# Patient Record
Sex: FEMALE | Race: WHITE | NOT HISPANIC OR LATINO | Employment: OTHER | URBAN - NONMETROPOLITAN AREA
[De-identification: names, ages, dates, MRNs, and addresses within clinical notes are randomized per-mention and may not be internally consistent; named-entity substitution may affect disease eponyms.]

---

## 2017-04-12 DIAGNOSIS — G89.29 OTHER CHRONIC PAIN: ICD-10-CM

## 2017-04-12 RX ORDER — GABAPENTIN 300 MG/1
300 CAPSULE ORAL 3 TIMES DAILY
Qty: 90 CAP | Refills: 3 | Status: CANCELLED | OUTPATIENT
Start: 2017-04-12

## 2017-04-12 RX ORDER — NAPROXEN 500 MG/1
500 TABLET ORAL 2 TIMES DAILY WITH MEALS
Qty: 60 TAB | Refills: 3 | Status: CANCELLED | OUTPATIENT
Start: 2017-04-12

## 2017-05-17 DIAGNOSIS — G89.29 OTHER CHRONIC PAIN: ICD-10-CM

## 2017-05-18 RX ORDER — GABAPENTIN 300 MG/1
300 CAPSULE ORAL 3 TIMES DAILY
Qty: 90 CAP | Refills: 0 | Status: SHIPPED | OUTPATIENT
Start: 2017-05-18 | End: 2017-05-30 | Stop reason: SDUPTHER

## 2017-05-18 RX ORDER — NAPROXEN 500 MG/1
500 TABLET ORAL 2 TIMES DAILY WITH MEALS
Qty: 60 TAB | Refills: 0 | Status: SHIPPED | OUTPATIENT
Start: 2017-05-18 | End: 2017-05-30

## 2017-05-30 ENCOUNTER — OFFICE VISIT (OUTPATIENT)
Dept: MEDICAL GROUP | Facility: CLINIC | Age: 42
End: 2017-05-30
Payer: COMMERCIAL

## 2017-05-30 VITALS
RESPIRATION RATE: 18 BRPM | WEIGHT: 285 LBS | BODY MASS INDEX: 42.21 KG/M2 | TEMPERATURE: 98.1 F | HEART RATE: 84 BPM | HEIGHT: 69 IN | SYSTOLIC BLOOD PRESSURE: 132 MMHG | DIASTOLIC BLOOD PRESSURE: 68 MMHG | OXYGEN SATURATION: 95 %

## 2017-05-30 DIAGNOSIS — Z23 NEED FOR VACCINATION: ICD-10-CM

## 2017-05-30 DIAGNOSIS — Z12.31 VISIT FOR SCREENING MAMMOGRAM: ICD-10-CM

## 2017-05-30 DIAGNOSIS — G89.29 OTHER CHRONIC PAIN: ICD-10-CM

## 2017-05-30 DIAGNOSIS — M54.9 UPPER BACK PAIN: ICD-10-CM

## 2017-05-30 DIAGNOSIS — E66.01 MORBID OBESITY WITH BMI OF 40.0-44.9, ADULT (HCC): ICD-10-CM

## 2017-05-30 PROCEDURE — 90471 IMMUNIZATION ADMIN: CPT | Performed by: PHYSICIAN ASSISTANT

## 2017-05-30 PROCEDURE — 99214 OFFICE O/P EST MOD 30 MIN: CPT | Mod: 25 | Performed by: PHYSICIAN ASSISTANT

## 2017-05-30 PROCEDURE — 90715 TDAP VACCINE 7 YRS/> IM: CPT | Performed by: PHYSICIAN ASSISTANT

## 2017-05-30 RX ORDER — MELOXICAM 7.5 MG/1
7.5 TABLET ORAL DAILY
Qty: 30 TAB | Refills: 2 | Status: SHIPPED | OUTPATIENT
Start: 2017-05-30 | End: 2021-04-01

## 2017-05-30 RX ORDER — GABAPENTIN 300 MG/1
300 CAPSULE ORAL 3 TIMES DAILY
Qty: 90 CAP | Refills: 2 | Status: SHIPPED | OUTPATIENT
Start: 2017-05-30 | End: 2021-04-01

## 2017-05-30 RX ORDER — CYCLOBENZAPRINE HCL 5 MG
5 TABLET ORAL
Qty: 7 TAB | Refills: 0 | Status: SHIPPED | OUTPATIENT
Start: 2017-05-30 | End: 2021-04-01

## 2017-05-30 ASSESSMENT — PAIN SCALES - GENERAL: PAINLEVEL: 7=MODERATE-SEVERE PAIN

## 2017-05-30 NOTE — PROGRESS NOTES
Chief Complaint   Patient presents with   • Establish Care   • Back Pain     lower x 6 years / upper x 2 years   • Orders Needed     mammo       HISTORY OF PRESENT ILLNESS: Patient is a 41 y.o. female established patient who presents today for evaluation and management of:    Upper back pain  Has been using a  that has helped. Has been to physical therapy in the past. She believes this made her pain worse. Her  occasionally massages her back which helps sometimes. She uses hot packs each evening which alleviate the pain enough so she can get to sleep. She notices that heavy lifting makes this pain worse. She has been through pain management before and does not want to restart opioids. This pain radiates down her left arm. Gabapentin alleviates some of her pain but she does not like the side effects.     Morbid obesity with BMI of 40.0-44.9, adult (Formerly [facility])  Patient states she is struggling with this due to use of gabapentin as it increases her appetite. She is attempting to reduce her calorie intake. She realizes that reducing her weight will alleviate some of her upper back pain.         Patient Active Problem List    Diagnosis Date Noted   • Morbid obesity with BMI of 40.0-44.9, adult (Formerly [facility]) 05/30/2017   • Pain of right eye 06/17/2016   • Wart 06/17/2016   • Hypothyroid 05/17/2016   • Family history of diabetes mellitus (DM) 03/31/2016   • Chronic low back pain 03/31/2016   • Upper back pain 03/31/2016   • Insomnia 03/31/2016       Allergies:Fentanyl and Tramadol    Current Outpatient Prescriptions   Medication Sig Dispense Refill   • meloxicam (MOBIC) 7.5 MG Tab Take 1 Tab by mouth every day. 30 Tab 2   • cyclobenzaprine (FLEXERIL) 5 MG tablet Take 1 Tab by mouth 1 time daily as needed for Moderate Pain. 7 Tab 0   • gabapentin (NEURONTIN) 300 MG Cap Take 1 Cap by mouth 3 times a day. 90 Cap 2   • amitriptyline (ELAVIL) 25 MG Tab Take 1 Tab by mouth at bedtime as needed. 30 Tab 5   • Omega-3 Fatty  "Acids (FISH OIL) 1000 MG Cap capsule Take 1,000 mg by mouth 1 time daily as needed.     • Methylsulfonylmethane (MSM PO) Take  by mouth.     • ascorbic acid (ASCORBIC ACID) 500 MG Tab Take 1,000 mg by mouth every day.     • POTASSIUM PO Take  by mouth.       No current facility-administered medications for this visit.       Social History   Substance Use Topics   • Smoking status: Former Smoker     Types: Cigarettes   • Smokeless tobacco: Former User     Quit date: 03/31/1996   • Alcohol Use: No       No family status information on file.   History reviewed. No pertinent family history.    Review of Systems:   Constitutional: Negative for fever, chills, weight loss and malaise/fatigue.   HENT: Negative for ear pain, nosebleeds, congestion, sore throat and neck pain.    Eyes: Negative for blurred vision.   Respiratory: Negative for cough, sputum production, shortness of breath and wheezing.    Cardiovascular: Negative for chest pain, palpitations, orthopnea and leg swelling.   Gastrointestinal: Negative for heartburn, nausea, vomiting and abdominal pain.   Genitourinary: Negative for dysuria, urgency and frequency.   Musculoskeletal: See history of present illness above. Negative for myalgias, joint pain.   Skin: Negative for rash and itching.   Neurological: Negative for dizziness, tingling, tremors, sensory change, focal weakness and headaches. Negative for recent headaches. She occasionally gets migraines that are well-controlled if she uses amitriptyline to go to sleep every night.   Endo/Heme/Allergies: Does not bruise/bleed easily.   Psychiatric/Behavioral: Negative for depression, suicidal ideas and memory loss.  The patient is not nervous/anxious and does not have insomnia.      Exam:  Blood pressure 132/68, pulse 84, temperature 36.7 °C (98.1 °F), resp. rate 18, height 1.753 m (5' 9.02\"), weight 129.275 kg (285 lb), SpO2 95 %.  Body mass index is 42.07 kg/(m^2).  General:  Obese female in NAD  Head: is " grossly normal.  Neck: Supple without masses. Thyroid is not visibly enlarged.  Pulmonary: Clear to ausculation. Normal effort. No rales, ronchi, or wheezing.  Cardiovascular: Regular rate and rhythm without murmur. Carotid and radial pulses are intact and equal bilaterally.  Extremities: no clubbing, cyanosis, or edema.  Musculoskeletal: Tenderness to palpation at upper thoracic left side of spinous processes. Left hand pain is stimulated to palpation of this region.    Medical decision-making and discussion:  1. Upper back pain  - REFERRAL TO PAIN CLINIC  - meloxicam (MOBIC) 7.5 MG Tab; Take 1 Tab by mouth every day.  Dispense: 30 Tab; Refill: 2  - cyclobenzaprine (FLEXERIL) 5 MG tablet; Take 1 Tab by mouth 1 time daily as needed for Moderate Pain.  Dispense: 7 Tab; Refill: 0  - gabapentin (NEURONTIN) 300 MG Cap; Take 1 Cap by mouth 3 times a day.  Dispense: 90 Cap; Refill: 2  - MR-CERVICAL SPINE-W/O; Future  - MR-THORACIC SPINE-W/O; Future    2. Visit for screening mammogram  - [id number]; Future    3. Need for vaccination  - Tdap =>8yo IM    5. Morbid obesity with BMI of 40.0-44.9, adult (CMS-[facility])  - Patient identified as having weight management issue.  Appropriate orders and counseling given.      Please note that this dictation was created using voice recognition software. I have made every reasonable attempt to correct obvious errors, but I expect that there are errors of grammar and possibly content that I did not discover before finalizing the note.      Return for Female annual.

## 2017-05-30 NOTE — MR AVS SNAPSHOT
"        Rina Roger   2017 10:40 AM   Office Visit   MRN: [id number]    [facility] Phone:  [phone]    Description:  Female : 1975   Provider:  Trinity Steven PA-C           Reason for Visit     Establish Care     Back Pain lower x 6 years / upper x 2 years    Orders Needed mammo      Allergies as of 2017     Allergen Noted Reactions    Fentanyl 2016       Tramadol 2016         You were diagnosed with     Upper back pain   [2570448]       Visit for screening mammogram   [[id number]]       Need for vaccination   [[id number]]       Other chronic pain   [338.29.ICD-9-CM]       Morbid obesity with BMI of 40.0-44.9, adult (CMS-[facility])   [216686]         Vital Signs     Blood Pressure Pulse Temperature Respirations Height Weight    132/68 mmHg 84 36.7 °C (98.1 °F) 18 1.753 m (5' 9.02\") 129.275 kg (285 lb)    Body Mass Index Oxygen Saturation Smoking Status             42.07 kg/m2 95% Former Smoker         Basic Information     Date Of Birth Sex Race Ethnicity Preferred Language    1975 Female White Unknown English      Problem List              ICD-10-CM Priority Class Noted - Resolved    Family history of diabetes mellitus (DM) Z83.3   3/31/2016 - Present    Chronic low back pain M54.5, G89.29   3/31/2016 - Present    Upper back pain M54.9   3/31/2016 - Present    Insomnia G47.00   3/31/2016 - Present    Hypothyroid E03.9   2016 - Present    Pain of right eye H57.11   2016 - Present    Wart B07.9   2016 - Present    Morbid obesity with BMI of 40.0-44.9, adult ([facility]) E66.01, Z68.41   2017 - Present      Health Maintenance        Date Due Completion Dates    IMM DTaP/Tdap/Td Vaccine (1 - Tdap) 10/1/1994 ---    PAP SMEAR 10/1/1996 ---    MAMMOGRAM 10/1/2015 ---            Current Immunizations     Tdap Vaccine 2017      Below and/or attached are the medications your provider expects you to take. Review all of your home medications and newly " ordered medications with your provider and/or pharmacist. Follow medication instructions as directed by your provider and/or pharmacist. Please keep your medication list with you and share with your provider. Update the information when medications are discontinued, doses are changed, or new medications (including over-the-counter products) are added; and carry medication information at all times in the event of emergency situations     Allergies:  FENTANYL - (reactions not documented)     TRAMADOL - (reactions not documented)               Medications  Valid as of: May 30, 2017 -  4:21 PM    Generic Name Brand Name Tablet Size Instructions for use    Amitriptyline HCl (Tab) ELAVIL 25 MG Take 1 Tab by mouth at bedtime as needed.        Ascorbic Acid (Tab) ascorbic acid 500 MG Take 1,000 mg by mouth every day.        Cyclobenzaprine HCl (Tab) FLEXERIL 5 MG Take 1 Tab by mouth 1 time daily as needed for Moderate Pain.        Gabapentin (Cap) NEURONTIN 300 MG Take 1 Cap by mouth 3 times a day.        Meloxicam (Tab) MOBIC 7.5 MG Take 1 Tab by mouth every day.        Methylsulfonylmethane   Take  by mouth.        Omega-3 Fatty Acids (Cap) fish oil 1000 MG Take 1,000 mg by mouth 1 time daily as needed.        Potassium   Take  by mouth.        .                 Medicines prescribed today were sent to:     [facility] - [address] [facility] [address]    Phone: [phone] Fax: [phone]    Open 24 Hours?: No      Medication refill instructions:       If your prescription bottle indicates you have medication refills left, it is not necessary to call your provider’s office. Please contact your pharmacy and they will refill your medication.    If your prescription bottle indicates you do not have any refills left, you may request refills at any time through one of the following ways: The online EasyLink system (except Urgent Care), by calling your  provider’s office, or by asking your pharmacy to contact your provider’s office with a refill request. Medication refills are processed only during regular business hours and may not be available until the next business day. Your provider may request additional information or to have a follow-up visit with you prior to refilling your medication.   *Please Note: Medication refills are assigned a new Rx number when refilled electronically. Your pharmacy may indicate that no refills were authorized even though a new prescription for the same medication is available at the pharmacy. Please request the medicine by name with the pharmacy before contacting your provider for a refill.        Your To Do List     Future Labs/Procedures Complete By Expires    [id number]  As directed 5/30/2018    MR-CERVICAL SPINE-W/O  As directed 5/30/2018    MR-THORACIC SPINE-W/O  As directed 5/30/2018      Referral     A referral request has been sent to our patient care coordination department. Please allow 3-5 business days for us to process this request and contact you either by phone or mail. If you do not hear from us by the 5th business day, please call us at [phone].           [employer] Access Code: Activation code not generated  Current [employer] Status: Active

## 2020-04-13 ENCOUNTER — TELEMEDICINE (OUTPATIENT)
Dept: CARDIOLOGY | Facility: MEDICAL CENTER | Age: 45
End: 2020-04-13
Payer: MEDICAID

## 2020-04-13 VITALS — BODY MASS INDEX: 39.99 KG/M2 | HEIGHT: 69 IN | WEIGHT: 270 LBS | HEART RATE: 94 BPM

## 2020-04-13 DIAGNOSIS — R06.83 SNORING: ICD-10-CM

## 2020-04-13 DIAGNOSIS — R00.2 PALPITATIONS: ICD-10-CM

## 2020-04-13 DIAGNOSIS — E66.01 MORBID OBESITY WITH BMI OF 40.0-44.9, ADULT (HCC): ICD-10-CM

## 2020-04-13 PROCEDURE — 99204 OFFICE O/P NEW MOD 45 MIN: CPT | Mod: 95,CR | Performed by: INTERNAL MEDICINE
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
## 2020-04-14 ENCOUNTER — TELEPHONE (OUTPATIENT)
Dept: CARDIOLOGY | Facility: MEDICAL CENTER | Age: 45
End: 2020-04-14

## 2020-04-14 NOTE — TELEPHONE ENCOUNTER
OPO per TW faxed to   [facility]  F: [phone]  T: [phone]    Pt. INS: Wes

## 2020-04-30 ENCOUNTER — HOSPITAL ENCOUNTER (OUTPATIENT)
Dept: CARDIOLOGY | Facility: MEDICAL CENTER | Age: 45
End: 2020-04-30
Attending: INTERNAL MEDICINE
Payer: MEDICAID

## 2020-04-30 DIAGNOSIS — R00.2 PALPITATIONS: ICD-10-CM

## 2020-04-30 PROCEDURE — 93306 TTE W/DOPPLER COMPLETE: CPT

## 2020-05-01 LAB
LV EJECT FRACT  99904: 70
LV EJECT FRACT MOD 2C 99903: 67.84
LV EJECT FRACT MOD 4C 99902: 71
LV EJECT FRACT MOD BP 99901: 69.78

## 2020-05-01 PROCEDURE — 93306 TTE W/DOPPLER COMPLETE: CPT | Mod: 26 | Performed by: INTERNAL MEDICINE

## 2021-03-29 ENCOUNTER — NURSE TRIAGE (OUTPATIENT)
Dept: HEALTH INFORMATION MANAGEMENT | Facility: OTHER | Age: 46
End: 2021-03-29

## 2021-03-29 SDOH — ECONOMIC STABILITY: HOUSING INSECURITY: IN THE LAST 12 MONTHS, HOW MANY PLACES HAVE YOU LIVED?: 1

## 2021-03-29 SDOH — ECONOMIC STABILITY: HOUSING INSECURITY
IN THE LAST 12 MONTHS, WAS THERE A TIME WHEN YOU DID NOT HAVE A STEADY PLACE TO SLEEP OR SLEPT IN A SHELTER (INCLUDING NOW)?: NO

## 2021-03-29 SDOH — HEALTH STABILITY: PHYSICAL HEALTH: ON AVERAGE, HOW MANY MINUTES DO YOU ENGAGE IN EXERCISE AT THIS LEVEL?: 50 MINUTES

## 2021-03-29 SDOH — HEALTH STABILITY: MENTAL HEALTH
STRESS IS WHEN SOMEONE FEELS TENSE, NERVOUS, ANXIOUS, OR CAN'T SLEEP AT NIGHT BECAUSE THEIR MIND IS TROUBLED. HOW STRESSED ARE YOU?: RATHER MUCH

## 2021-03-29 SDOH — ECONOMIC STABILITY: INCOME INSECURITY: IN THE LAST 12 MONTHS, WAS THERE A TIME WHEN YOU WERE NOT ABLE TO PAY THE MORTGAGE OR RENT ON TIME?: YES

## 2021-03-29 SDOH — ECONOMIC STABILITY: TRANSPORTATION INSECURITY
IN THE PAST 12 MONTHS, HAS LACK OF RELIABLE TRANSPORTATION KEPT YOU FROM MEDICAL APPOINTMENTS, MEETINGS, WORK OR FROM GETTING THINGS NEEDED FOR DAILY LIVING?: NO

## 2021-03-29 SDOH — ECONOMIC STABILITY: HOUSING INSECURITY
IN THE LAST 12 MONTHS, WAS THERE A TIME WHEN YOU DID NOT HAVE A STEADY PLACE TO SLEEP OR SLEPT IN A SHELTER (INCLUDING NOW)?: YES

## 2021-03-29 SDOH — HEALTH STABILITY: PHYSICAL HEALTH: ON AVERAGE, HOW MANY DAYS PER WEEK DO YOU ENGAGE IN MODERATE TO STRENUOUS EXERCISE (LIKE A BRISK WALK)?: 3 DAYS

## 2021-03-29 SDOH — ECONOMIC STABILITY: TRANSPORTATION INSECURITY
IN THE PAST 12 MONTHS, HAS THE LACK OF TRANSPORTATION KEPT YOU FROM MEDICAL APPOINTMENTS OR FROM GETTING MEDICATIONS?: NO

## 2021-03-29 ASSESSMENT — SOCIAL DETERMINANTS OF HEALTH (SDOH)
HOW OFTEN DO YOU GET TOGETHER WITH FRIENDS OR RELATIVES?: NEVER
HOW OFTEN DO YOU ATTEND CHURCH OR RELIGIOUS SERVICES?: NEVER
HOW OFTEN DO YOU HAVE A DRINK CONTAINING ALCOHOL: NEVER
WITHIN THE PAST 12 MONTHS, THE FOOD YOU BOUGHT JUST DIDN'T LAST AND YOU DIDN'T HAVE MONEY TO GET MORE: NEVER TRUE
HOW HARD IS IT FOR YOU TO PAY FOR THE VERY BASICS LIKE FOOD, HOUSING, MEDICAL CARE, AND HEATING?: SOMEWHAT HARD
HOW OFTEN DO YOU HAVE SIX OR MORE DRINKS ON ONE OCCASION: NEVER
HOW OFTEN DO YOU ATTENT MEETINGS OF THE CLUB OR ORGANIZATION YOU BELONG TO?: NEVER
IN A TYPICAL WEEK, HOW MANY TIMES DO YOU TALK ON THE PHONE WITH FAMILY, FRIENDS, OR NEIGHBORS?: MORE THAN THREE TIMES A WEEK
DO YOU BELONG TO ANY CLUBS OR ORGANIZATIONS SUCH AS CHURCH GROUPS UNIONS, FRATERNAL OR ATHLETIC GROUPS, OR SCHOOL GROUPS?: NO
HOW MANY DRINKS CONTAINING ALCOHOL DO YOU HAVE ON A TYPICAL DAY WHEN YOU ARE DRINKING: PATIENT DECLINED
WITHIN THE PAST 12 MONTHS, YOU WORRIED THAT YOUR FOOD WOULD RUN OUT BEFORE YOU GOT THE MONEY TO BUY MORE: SOMETIMES TRUE

## 2021-03-29 NOTE — TELEPHONE ENCOUNTER
"PT WANTING TO EST CARE. HAS NOT SEEN PCP IN 6 YEARS. WANTS A NEW PROVIDER. EXP PALPITATIONS, GYNECOLOGICAL ISSUES. REVIEWED CURRENT SX. DISCUSSED HOME CARE, WHEN TO SEEK EMERGENCY CARE AND APPT MADE.     Reason for Disposition  • Palpitations are a chronic symptom (recurrent or ongoing AND present > 4 weeks)    Additional Information  • Negative: Passed out (i.e., fainted, collapsed and was not responding)  • Negative: Shock suspected (e.g., cold/pale/clammy skin, too weak to stand, low BP, rapid pulse)  • Negative: Difficult to awaken or acting confused (e.g., disoriented, slurred speech)  • Negative: Visible sweat on face or sweat dripping down face  • Negative: Unable to walk, or can only walk with assistance (e.g., requires support)  • Negative: Received SHOCK from implantable cardiac defibrillator and has persisting symptoms (i.e., palpitations, lightheadedness)  • Negative: Sounds like a life-threatening emergency to the triager  • Negative: Chest pain  • Negative: Difficulty breathing  • Negative: Dizziness, lightheadedness, or weakness  • Negative: Heart beating very rapidly (e.g., > 140 / minute) and present now (EXCEPTION: during exercise)  • Negative: Heart beating very slowly (e.g., < 50 / minute) (EXCEPTION: athlete)  • Negative: Wearing a \"holter monitor\" or \"cardiac event monitor\"  • Negative: Received SHOCK from implantable cardiac defibrillator (and now feels well)  • Negative: Heart beating very rapidly (e.g., > 140 / minute) and not present now (EXCEPTION: during exercise)  • Negative: Skipped or extra beat(s) and increases with exercise or exertion  • Negative: Skipped or extra beat(s) and occurs 4 or more times per minute  • Negative: History of heart disease (i.e., heart attack, bypass surgery, angina, angioplasty)  • Negative: Age > 60 years  • Negative: Taking water pill (i.e., diuretic) or heart medication (e.g., digoxin)  • Negative: Patient wants to be seen  • Negative: History of " "hyperthyroidism or taking thyroid medication  • Negative: Known or suspected substance abuse (e.g., cocaine, alcohol abuse)  • Negative: Palpitations and no improvement after following Care Advice  • Negative: Problems with anxiety or stress    Answer Assessment - Initial Assessment Questions  1. DESCRIPTION: \"Please describe your heart rate or heart beat that you are having\" (e.g., fast/slow, regular/irregular, skipped or extra beats, \"palpitations\")      palpitations  2. ONSET: \"When did it start?\" (Minutes, hours or days)       years  3. DURATION: \"How long does it last\" (e.g., seconds, minutes, hours)      Seconds, twice a month  4. PATTERN \"Does it come and go, or has it been constant since it started?\"  \"Does it get worse with exertion?\"   \"Are you feeling it now?\"      Comes and goes  5. TAP: \"Using your hand, can you tap out what you are feeling on a chair or table in front of you, so that I can hear?\" (Note: not all patients can do this)        n/a  6. HEART RATE: \"Can you tell me your heart rate?\" \"How many beats in 15 seconds?\"  (Note: not all patients can do this)        n/a  7. RECURRENT SYMPTOM: \"Have you ever had this before?\" If so, ask: \"When was the last time?\" and \"What happened that time?\"       yes  8. CAUSE: \"What do you think is causing the palpitations?\"      unsure  9. CARDIAC HISTORY: \"Do you have any history of heart disease?\" (e.g., heart attack, angina, bypass surgery, angioplasty, arrhythmia)       no  10. OTHER SYMPTOMS: \"Do you have any other symptoms?\" (e.g., dizziness, chest pain, sweating, difficulty breathing)        no  11. PREGNANCY: \"Is there any chance you are pregnant?\" \"When was your last menstrual period?\"        no    Protocols used: HEART RATE AND HEARTBEAT [id number]      "

## 2021-04-01 ENCOUNTER — OFFICE VISIT (OUTPATIENT)
Dept: MEDICAL GROUP | Facility: CLINIC | Age: 46
End: 2021-04-01
Payer: MEDICAID

## 2021-04-01 VITALS
RESPIRATION RATE: 16 BRPM | HEIGHT: 69 IN | BODY MASS INDEX: 41.32 KG/M2 | HEART RATE: 82 BPM | TEMPERATURE: 97.7 F | DIASTOLIC BLOOD PRESSURE: 72 MMHG | WEIGHT: 279 LBS | SYSTOLIC BLOOD PRESSURE: 124 MMHG | OXYGEN SATURATION: 94 %

## 2021-04-01 DIAGNOSIS — R00.2 PALPITATIONS: ICD-10-CM

## 2021-04-01 DIAGNOSIS — E66.01 MORBID OBESITY WITH BMI OF 40.0-44.9, ADULT (HCC): ICD-10-CM

## 2021-04-01 DIAGNOSIS — F33.1 MODERATE EPISODE OF RECURRENT MAJOR DEPRESSIVE DISORDER (HCC): ICD-10-CM

## 2021-04-01 DIAGNOSIS — Z00.00 BLOOD TESTS FOR ROUTINE GENERAL PHYSICAL EXAMINATION: ICD-10-CM

## 2021-04-01 DIAGNOSIS — E03.9 ACQUIRED HYPOTHYROIDISM: ICD-10-CM

## 2021-04-01 DIAGNOSIS — E55.9 VITAMIN D DEFICIENCY: ICD-10-CM

## 2021-04-01 DIAGNOSIS — R73.03 PREDIABETES: ICD-10-CM

## 2021-04-01 DIAGNOSIS — H57.11 PAIN OF RIGHT EYE: ICD-10-CM

## 2021-04-01 DIAGNOSIS — Z12.31 ENCOUNTER FOR SCREENING MAMMOGRAM FOR BREAST CANCER: ICD-10-CM

## 2021-04-01 DIAGNOSIS — N95.1 PERIMENOPAUSAL SYMPTOMS: ICD-10-CM

## 2021-04-01 DIAGNOSIS — Z86.59 HISTORY OF POSTTRAUMATIC STRESS DISORDER (PTSD): ICD-10-CM

## 2021-04-01 PROBLEM — E66.9 OBESITY (BMI 30-39.9): Status: ACTIVE | Noted: 2021-04-01

## 2021-04-01 PROBLEM — F32.9 MAJOR DEPRESSIVE DISORDER: Status: ACTIVE | Noted: 2021-04-01

## 2021-04-01 PROCEDURE — 99204 OFFICE O/P NEW MOD 45 MIN: CPT | Performed by: PHYSICIAN ASSISTANT

## 2021-04-01 RX ORDER — SERTRALINE HYDROCHLORIDE 25 MG/1
25 TABLET, FILM COATED ORAL DAILY
Qty: 30 TABLET | Refills: 0 | Status: SHIPPED
Start: 2021-04-01 | End: 2021-05-26

## 2021-04-01 ASSESSMENT — PATIENT HEALTH QUESTIONNAIRE - PHQ9
5. POOR APPETITE OR OVEREATING: 2 - MORE THAN HALF THE DAYS
CLINICAL INTERPRETATION OF PHQ2 SCORE: 3
SUM OF ALL RESPONSES TO PHQ QUESTIONS 1-9: 11

## 2021-04-01 NOTE — PROGRESS NOTES
Chief Complaint   Patient presents with   • Establish Care     pt states she has a list of things to go over.         HPI:  Rina is a 45 y.o. female new patient presenting with the following:    Hypothyroid  This is been an intermittent problem for this patient in the past.  She has been on thyroid replacement but is not currently taking any.  Patient has not had labs done in a few years.  We will reorder labs to check levels and follow-up in 2 weeks.  We will determine if thyroid replacement is needed at that time.    Palpitations  This is a chronic condition for this patient.  Patient states that when she gets palpitations that they frightened her and make her panic. Patient had an echocardiogram done 05/20 that was normal.  There has been no additional work-up done for the palpitations.  We will order a 48-hour Holter monitor to see if we can capture these palpitations and assess their nature.  We will follow-up with her in 2 weeks to review labs and Holter report if available.  Depending on what the Holter monitor shows we may send her back for cardiology reevaluation.    Major depressive disorder  This is a chronic condition for this patient.  She has been on medication in the distant past but cannot remember which medication she was on. Her PHQ-9 in the office today is 11 with thoughts of being better off dead.  Patient states that she is not suicidal just sometimes wishes she would not wake up in the morning due to the amount of stress and depression.  Patient has requested a referral to behavioral health for evaluation and counseling.  We will start her on sertraline 25 mg daily as she tends to be very sensitive to medications.  We will follow-up with her in 2 weeks and make adjustments to her medication if necessary.    Perimenopausal symptoms  Patient states that she is having hot flashes, night sweats, severe mood swings and heavier than normal periods.  She states that she is overly emotional and will  cry for no reason.  This is very distressing for this patient.  We will start her on sertraline 25 mg daily to try and help with the mood swings.  Should she continue to have very heavy menstrual cycles we will make a referral to gynecology for possible IUD implantation to control her bleeding but she is not interested in doing this at this time.  We will follow-up with her in 2 weeks to evaluate the effectiveness of this medication.      Patient Active Problem List    Diagnosis Date Noted   • Prediabetes 04/01/2021   • History of posttraumatic stress disorder (PTSD) 04/01/2021   • Major depressive disorder 04/01/2021   • Vitamin D deficiency 04/01/2021   • Perimenopausal symptoms 04/01/2021   • Palpitations 04/13/2020   • Morbid obesity with BMI of 40.0-44.9, adult (HCC) 05/30/2017   • Pain of right eye 06/17/2016   • Wart 06/17/2016   • Hypothyroid 05/17/2016   • Family history of diabetes mellitus (DM) 03/31/2016   • Chronic low back pain 03/31/2016   • Upper back pain 03/31/2016   • Insomnia 03/31/2016       Current Outpatient Medications   Medication Sig Dispense Refill   • sertraline (ZOLOFT) 25 MG tablet Take 1 tablet by mouth every day. 30 tablet 0   • Omega-3 Fatty Acids (FISH OIL) 1000 MG Cap capsule Take 1,000 mg by mouth 1 time daily as needed.     • Methylsulfonylmethane (MSM PO) Take  by mouth.     • POTASSIUM PO Take  by mouth.       No current facility-administered medications for this visit.         Allergies as of 04/01/2021 - Reviewed 04/01/2021   Allergen Reaction Noted   • Fentanyl Shortness of Breath 03/31/2016   • Tramadol Vomiting and Nausea 03/31/2016        Social History     Socioeconomic History   • Marital status:      Spouse name: eva Roger   • Number of children: 1   • Years of education: Not on file   • Highest education level: 11th grade   Occupational History   • Occupation: dispatcher     Employer: OTHER     Comment: self employed   Tobacco Use   • Smoking status:  Former Smoker     Packs/day: 1.00     Years: 35.00     Pack years: 35.00     Types: Cigarettes     Start date: 1991   • Smokeless tobacco: Never Used   Substance and Sexual Activity   • Alcohol use: No     Alcohol/week: 0.0 oz   • Drug use: Yes     Frequency: 3.0 times per week     Types: Marijuana, Oral     Comment: tincture   • Sexual activity: Yes     Partners: Male     Birth control/protection: None   Other Topics Concern   • Not on file   Social History Narrative   • Not on file     Social Determinants of Health     Financial Resource Strain: Medium Risk   • Difficulty of Paying Living Expenses: Somewhat hard   Food Insecurity: Food Insecurity Present   • Worried About Running Out of Food in the Last Year: Sometimes true   • Ran Out of Food in the Last Year: Never true   Transportation Needs: No Transportation Needs   • Lack of Transportation (Medical): No   • Lack of Transportation (Non-Medical): No   Physical Activity: Sufficiently Active   • Days of Exercise per Week: 3 days   • Minutes of Exercise per Session: 50 min   Stress: Stress Concern Present   • Feeling of Stress : Rather much   Social Connections: Somewhat Isolated   • Frequency of Communication with Friends and Family: More than three times a week   • Frequency of Social Gatherings with Friends and Family: Never   • Attends Denominational Services: Never   • Active Member of Clubs or Organizations: No   • Attends Club or Organization Meetings: Never   • Marital Status:    Intimate Partner Violence:    • Fear of Current or Ex-Partner:    • Emotionally Abused:    • Physically Abused:    • Sexually Abused:        Family History   Problem Relation Age of Onset   • Lung Disease Mother         COPD   • Hypertension Mother         Pulmonary hypertension   • Arthritis Mother    • Cancer Mother         melanoma (skin)   • Lupus Mother    • Mult Sclerosis Father    • Hypertension Brother    • Alcohol abuse Brother    • Cancer Maternal Grandmother          "Uterine   • Diabetes Maternal Grandfather    • Heart Disease Maternal Grandfather    • Hypertension Maternal Grandfather    • Hyperlipidemia Maternal Grandfather    • Heart Attack Maternal Grandfather    • No Known Problems Paternal Grandfather        Past Surgical History:   Procedure Laterality Date   • CARPAL TUNNEL RELEASE Right 2012   • CHOLECYSTECTOMY  2011       Review of Systems:   Constitutional: Negative for fever, chills, weight change, fatigue, loss of appetite.  HNT: Negative for nosebleeds, congestion, odynophagia, sore throat or changes in taste.    Eyes: Negative for vision changes.   Ears: Negative for recent hearing changes, pain or discharge.  Neck: Negative for pain, swelling, lumps or goiter.  Respiratory: Negative for cough, sputum production, shortness of breath and wheezing.    Cardiovascular: Negative for chest pain, palpitations, orthopnea and leg swelling.   Gastrointestinal: Negative for constipation, diarrhea, heartburn, dysphagia, nausea, vomiting or abdominal pain.   Genitourinary: Negative for dysuria, urgency and frequency.   Musculoskeletal: Negative for myalgias, joint pain, and back pain.  Skin: Negative for skin, hair or nail changes, rash, itching.   Neurological: Negative for dizziness, tingling, tremors, sensory change, gait/coordination changes, focal weakness and headaches.   Endo/Heme/Allergies: Does not bruise/bleed easily.   Psychiatric/Behavioral: Positive for anxiety, depression.  Negative for suicidal ideas and memory loss.  The patient does not have insomnia.      No LMP recorded.     Physical Exam:  /72   Pulse 82   Temp 36.5 °C (97.7 °F) (Temporal)   Resp 16   Ht 1.753 m (5' 9\")   Wt (!) 127 kg (279 lb)   SpO2 94%   BMI 41.20 kg/m²    General:  Well nourished, well developed female. With a Body mass index is 41.2 kg/m².  Tearful and anxious in the clinic today.  Eyes: EOM intact, PERRL, conjunctiva non-injected, sclera non-icteric.  Ears: Lizbeth pinnae, " external auditory canals, TM pearly gray with normal light reflex bilaterally.  Neck: Neck supple with no cervical lymphadenopathy, JVD, palpable thyroid nodules or carotid bruits.  Pulmonary: Clear to ausculation bilaterally. Normal effort. No rales, ronchi, or wheezing.  Cardiovascular: Regular rate and rhythm without murmur, rub or gallop.   Extremities: Full range of motion. Warm and well perfused with no edema.  Skin: Intact with no obvious rashes or lesions.  Neuro: Cranial nerves I-XII grossly intact.  Psych: Alert and oriented x 3.  Appropriately dressed. Mood and affect appropriate.    Assessment/Plan:    1. Prediabetes  Comp Metabolic Panel    HEMOGLOBIN A1C   2. History of posttraumatic stress disorder (PTSD)  Patient has been identified as having a positive depression screening. Appropriate orders and counseling have been given.    REFERRAL TO BEHAVIORAL HEALTH   3. Moderate episode of recurrent major depressive disorder (HCC)  Patient has been identified as having a positive depression screening. Appropriate orders and counseling have been given.    REFERRAL TO BEHAVIORAL HEALTH    sertraline (ZOLOFT) 25 MG tablet   4. Encounter for screening mammogram for breast cancer  MA-SCREENING MAMMO BILAT W/TOMOSYNTHESIS W/CAD   5. Acquired hypothyroidism     6. Pain of right eye  TSH    TRIIDOTHYRONINE    FREE THYROXINE   7. Morbid obesity with BMI of 40.0-44.9, adult (HCC)  Patient identified as having weight management issue.  Appropriate orders and counseling given.   8. Vitamin D deficiency  VITAMIN D,25 HYDROXY   9. Blood tests for routine general physical examination  CBC WITH DIFFERENTIAL    Comp Metabolic Panel    ESTIMATED GFR    Lipid Profile   10. Palpitations  HOLTER - Cardiology Performed (48HR)    CANCELED: HOLTER - Cardiology Performed (48HR)   11. Perimenopausal symptoms         Reviewed risks and benefits of treatment plan. Patient verbally agrees to plan of care.     Return in about 2 weeks  (around 4/15/2021) for f/u labs.      Please note that this dictation was created using voice recognition software. I have made every reasonable attempt to correct obvious errors, but I expect that there are errors of grammar and possibly content that I did not discover before finalizing the note.

## 2021-04-02 NOTE — ASSESSMENT & PLAN NOTE
This is a chronic condition for this patient.  Patient states that when she gets palpitations that they frightened her and make her panic. Patient had an echocardiogram done 05/20 that was normal.  There has been no additional work-up done for the palpitations.  We will order a 48-hour Holter monitor to see if we can capture these palpitations and assess their nature.  We will follow-up with her in 2 weeks to review labs and Holter report if available.  Depending on what the Holter monitor shows we may send her back for cardiology reevaluation.

## 2021-04-02 NOTE — ASSESSMENT & PLAN NOTE
Patient states that she is having hot flashes, night sweats, severe mood swings and heavier than normal periods.  She states that she is overly emotional and will cry for no reason.  This is very distressing for this patient.  We will start her on sertraline 25 mg daily to try and help with the mood swings.  Should she continue to have very heavy menstrual cycles we will make a referral to gynecology for possible IUD implantation to control her bleeding but she is not interested in doing this at this time.  We will follow-up with her in 2 weeks to evaluate the effectiveness of this medication.

## 2021-04-02 NOTE — ASSESSMENT & PLAN NOTE
This is a chronic condition for this patient.  She has been on medication in the distant past but cannot remember which medication she was on. Her PHQ-9 in the office today is 11 with thoughts of being better off dead.  Patient states that she is not suicidal just sometimes wishes she would not wake up in the morning due to the amount of stress and depression.  Patient has requested a referral to behavioral health for evaluation and counseling.  We will start her on sertraline 25 mg daily as she tends to be very sensitive to medications.  We will follow-up with her in 2 weeks and make adjustments to her medication if necessary.

## 2021-04-02 NOTE — ASSESSMENT & PLAN NOTE
This is been an intermittent problem for this patient in the past.  She has been on thyroid replacement but is not currently taking any.  Patient has not had labs done in a few years.  We will reorder labs to check levels and follow-up in 2 weeks.  We will determine if thyroid replacement is needed at that time.

## 2021-04-05 ENCOUNTER — APPOINTMENT (OUTPATIENT)
Dept: RADIOLOGY | Facility: IMAGING CENTER | Age: 46
End: 2021-04-05
Attending: FAMILY MEDICINE
Payer: MEDICAID

## 2021-04-05 ENCOUNTER — NON-PROVIDER VISIT (OUTPATIENT)
Dept: MEDICAL GROUP | Facility: CLINIC | Age: 46
End: 2021-04-05
Payer: MEDICAID

## 2021-04-05 ENCOUNTER — OFFICE VISIT (OUTPATIENT)
Dept: URGENT CARE | Facility: PHYSICIAN GROUP | Age: 46
End: 2021-04-05
Payer: MEDICAID

## 2021-04-05 ENCOUNTER — HOSPITAL ENCOUNTER (OUTPATIENT)
Facility: MEDICAL CENTER | Age: 46
End: 2021-04-05
Attending: PHYSICIAN ASSISTANT
Payer: MEDICAID

## 2021-04-05 VITALS
OXYGEN SATURATION: 96 % | RESPIRATION RATE: 16 BRPM | BODY MASS INDEX: 41.2 KG/M2 | HEART RATE: 71 BPM | HEIGHT: 69 IN | WEIGHT: 278.2 LBS | SYSTOLIC BLOOD PRESSURE: 130 MMHG | TEMPERATURE: 97.6 F | DIASTOLIC BLOOD PRESSURE: 82 MMHG

## 2021-04-05 DIAGNOSIS — R93.89 ABNORMAL CHEST X-RAY: ICD-10-CM

## 2021-04-05 DIAGNOSIS — R06.02 SOB (SHORTNESS OF BREATH): ICD-10-CM

## 2021-04-05 DIAGNOSIS — L30.9 PERIORBITAL DERMATITIS: ICD-10-CM

## 2021-04-05 PROCEDURE — 84480 ASSAY TRIIODOTHYRONINE (T3): CPT

## 2021-04-05 PROCEDURE — 99214 OFFICE O/P EST MOD 30 MIN: CPT | Performed by: FAMILY MEDICINE

## 2021-04-05 PROCEDURE — 36415 COLL VENOUS BLD VENIPUNCTURE: CPT | Performed by: PHYSICIAN ASSISTANT

## 2021-04-05 PROCEDURE — 82306 VITAMIN D 25 HYDROXY: CPT

## 2021-04-05 PROCEDURE — 80053 COMPREHEN METABOLIC PANEL: CPT

## 2021-04-05 PROCEDURE — 80061 LIPID PANEL: CPT

## 2021-04-05 PROCEDURE — 84443 ASSAY THYROID STIM HORMONE: CPT

## 2021-04-05 PROCEDURE — 84439 ASSAY OF FREE THYROXINE: CPT

## 2021-04-05 PROCEDURE — 85025 COMPLETE CBC W/AUTO DIFF WBC: CPT

## 2021-04-05 PROCEDURE — 83036 HEMOGLOBIN GLYCOSYLATED A1C: CPT

## 2021-04-05 RX ORDER — DEXAMETHASONE 6 MG/1
6 TABLET ORAL DAILY
Qty: 3 TABLET | Refills: 0 | Status: SHIPPED | OUTPATIENT
Start: 2021-04-05 | End: 2021-04-08

## 2021-04-05 RX ORDER — ALBUTEROL SULFATE 90 UG/1
2 AEROSOL, METERED RESPIRATORY (INHALATION) EVERY 6 HOURS PRN
Qty: 8.5 G | Refills: 3 | Status: ON HOLD
Start: 2021-04-05 | End: 2022-01-21

## 2021-04-05 RX ORDER — DESONIDE 0.5 MG/G
CREAM TOPICAL
Qty: 30 G | Refills: 0 | Status: SHIPPED | OUTPATIENT
Start: 2021-04-05 | End: 2021-04-07

## 2021-04-05 RX ORDER — MONTELUKAST SODIUM 10 MG/1
10 TABLET ORAL EVERY EVENING
Qty: 30 TABLET | Refills: 1 | Status: SHIPPED
Start: 2021-04-05 | End: 2021-05-26

## 2021-04-05 ASSESSMENT — ENCOUNTER SYMPTOMS: SHORTNESS OF BREATH: 1

## 2021-04-05 NOTE — PROGRESS NOTES
Subjective:      Rina Roger is a 45 y.o. female who presents with Shortness of Breath (thinks it might be allergies), Eye Swelling, and Itchy Eye      - This is a pleasant and nontoxic appearing 45 y.o. female with c/o for around 2-3 weeks has had SOB, described as feeling to have to take a deeper breath then usually occasionally. No cough or NVFC/CP. Has Hx of asthma and feels may be related to asthma/allergies as has had this feeling before in spring time.    - itch rash around both eyes for about 2 wks.  Nothing new that may have triggered this      ALLERGIES:  Fentanyl and Tramadol     PMH:  Past Medical History:   Diagnosis Date   • Allergy     seasonal, meds   • Anxiety    • Arrhythmia    • Asthma     as a child   • Depression    • Diabetes (HCC)     Prediabetes   • GERD (gastroesophageal reflux disease)    • Head ache     with her period   • Hypertension    • Migraine     stopped when she moved out of [place], possibly allergy related   • Thyroid disease         PSH:  Past Surgical History:   Procedure Laterality Date   • CARPAL TUNNEL RELEASE Right 2012   • CHOLECYSTECTOMY  2011       MEDS:    Current Outpatient Medications:   •  desonide (TRIDESILON) 0.05 % Cream, AAA BID x 1 wk, Disp: 30 g, Rfl: 0  •  montelukast (SINGULAIR) 10 MG Tab, Take 1 tablet by mouth every evening., Disp: 30 tablet, Rfl: 1  •  albuterol 108 (90 Base) MCG/ACT Aero Soln inhalation aerosol, Inhale 2 Puffs every 6 hours as needed for Shortness of Breath., Disp: 8.5 g, Rfl: 3  •  dexamethasone (DECADRON) 6 MG Tab, Take 1 tablet by mouth every day for 3 days., Disp: 3 tablet, Rfl: 0  •  sertraline (ZOLOFT) 25 MG tablet, Take 1 tablet by mouth every day., Disp: 30 tablet, Rfl: 0  •  Omega-3 Fatty Acids (FISH OIL) 1000 MG Cap capsule, Take 1,000 mg by mouth 1 time daily as needed., Disp: , Rfl:   •  Methylsulfonylmethane (MSM PO), Take  by mouth., Disp: , Rfl:   •  POTASSIUM PO, Take  by mouth., Disp: , Rfl:     ** I have  "documented what I find to be significant in regards to past medical, social, family and surgical history  in my HPI or under PMH/PSH/FH review section, otherwise it is noncontributory **       HPI    Review of Systems   Respiratory: Positive for shortness of breath.    Skin: Positive for itching.   All other systems reviewed and are negative.     Objective:     /82   Pulse 71   Temp 36.4 °C (97.6 °F) (Temporal)   Resp 16   Ht 1.753 m (5' 9\") Comment: w/shoes  Wt (!) 126 kg (278 lb 3.2 oz) Comment: w/shoes  SpO2 96%   BMI 41.08 kg/m²      Physical Exam  Vitals and nursing note reviewed.   Constitutional:       General: She is not in acute distress.     Appearance: Normal appearance. She is well-developed.   HENT:      Head: Normocephalic and atraumatic.   Eyes:      General: No scleral icterus.  Cardiovascular:      Heart sounds: Normal heart sounds. No murmur.   Pulmonary:      Effort: Pulmonary effort is normal. No respiratory distress.      Breath sounds: Normal breath sounds.   Skin:     Coloration: Skin is not jaundiced or pale.      Findings: Rash (mild periorbital atopic like rash, Rt>Lt) present.   Neurological:      Mental Status: She is alert.      Motor: No abnormal muscle tone.   Psychiatric:         Mood and Affect: Mood normal.         Behavior: Behavior normal.                 Assessment/Plan:            1. SOB (shortness of breath)  DX-CHEST-2 VIEWS    montelukast (SINGULAIR) 10 MG Tab    albuterol 108 (90 Base) MCG/ACT Aero Soln inhalation aerosol    dexamethasone (DECADRON) 6 MG Tab    REFERRAL TO PULMONARY AND SLEEP MEDICINE   2. Periorbital dermatitis  desonide (TRIDESILON) 0.05 % Cream    montelukast (SINGULAIR) 10 MG Tab    dexamethasone (DECADRON) 6 MG Tab   3. Abnormal chest x-ray  REFERRAL TO PULMONARY AND SLEEP MEDICINE     XRay: small nodule Rt mid lung field.     - Dx, plan & d/c instructions discussed w/ patient and/or family members  - Rest, stay hydrated.   - E.R. " precautions discussed       Follow up with their PCP in 2-3 days strongly advised to f/u on xray abnormality, ER if not improving or feeling/getting worse.    Any realistic side effects of medications that may have been given today (i.e. Rash, GI upset/constipation, sedation, elevation of BP or blood sugars) discussed.     Patient left in stable condition

## 2021-04-06 DIAGNOSIS — R73.03 PREDIABETES: ICD-10-CM

## 2021-04-06 DIAGNOSIS — H57.11 PAIN OF RIGHT EYE: ICD-10-CM

## 2021-04-06 DIAGNOSIS — E55.9 VITAMIN D DEFICIENCY: ICD-10-CM

## 2021-04-06 DIAGNOSIS — Z00.00 BLOOD TESTS FOR ROUTINE GENERAL PHYSICAL EXAMINATION: ICD-10-CM

## 2021-04-06 LAB
ALBUMIN SERPL BCP-MCNC: 4.4 G/DL (ref 3.2–4.9)
ALBUMIN/GLOB SERPL: 1.4 G/DL
ALP SERPL-CCNC: 79 U/L (ref 30–99)
ALT SERPL-CCNC: 19 U/L (ref 2–50)
ANION GAP SERPL CALC-SCNC: 12 MMOL/L (ref 7–16)
AST SERPL-CCNC: 20 U/L (ref 12–45)
BASOPHILS # BLD AUTO: 0.7 % (ref 0–1.8)
BASOPHILS # BLD: 0.08 K/UL (ref 0–0.12)
BILIRUB SERPL-MCNC: 0.3 MG/DL (ref 0.1–1.5)
BUN SERPL-MCNC: 19 MG/DL (ref 8–22)
CALCIUM SERPL-MCNC: 9.3 MG/DL (ref 8.5–10.5)
CHLORIDE SERPL-SCNC: 105 MMOL/L (ref 96–112)
CHOLEST SERPL-MCNC: 159 MG/DL (ref 100–199)
CO2 SERPL-SCNC: 22 MMOL/L (ref 20–33)
CREAT SERPL-MCNC: 0.65 MG/DL (ref 0.5–1.4)
EOSINOPHIL # BLD AUTO: 0.23 K/UL (ref 0–0.51)
EOSINOPHIL NFR BLD: 1.9 % (ref 0–6.9)
ERYTHROCYTE [DISTWIDTH] IN BLOOD BY AUTOMATED COUNT: 50.6 FL (ref 35.9–50)
EST. AVERAGE GLUCOSE BLD GHB EST-MCNC: 131 MG/DL
GLOBULIN SER CALC-MCNC: 3.1 G/DL (ref 1.9–3.5)
GLUCOSE SERPL-MCNC: 112 MG/DL (ref 65–99)
HBA1C MFR BLD: 6.2 % (ref 4–5.6)
HCT VFR BLD AUTO: 43.2 % (ref 37–47)
HDLC SERPL-MCNC: 29 MG/DL
HGB BLD-MCNC: 13.3 G/DL (ref 12–16)
IMM GRANULOCYTES # BLD AUTO: 0.06 K/UL (ref 0–0.11)
IMM GRANULOCYTES NFR BLD AUTO: 0.5 % (ref 0–0.9)
LDLC SERPL CALC-MCNC: 105 MG/DL
LYMPHOCYTES # BLD AUTO: 2.46 K/UL (ref 1–4.8)
LYMPHOCYTES NFR BLD: 20.3 % (ref 22–41)
MCH RBC QN AUTO: 31 PG (ref 27–33)
MCHC RBC AUTO-ENTMCNC: 30.8 G/DL (ref 33.6–35)
MCV RBC AUTO: 100.7 FL (ref 81.4–97.8)
MONOCYTES # BLD AUTO: 0.71 K/UL (ref 0–0.85)
MONOCYTES NFR BLD AUTO: 5.9 % (ref 0–13.4)
NEUTROPHILS # BLD AUTO: 8.55 K/UL (ref 2–7.15)
NEUTROPHILS NFR BLD: 70.7 % (ref 44–72)
NRBC # BLD AUTO: 0 K/UL
NRBC BLD-RTO: 0 /100 WBC
PLATELET # BLD AUTO: 215 K/UL (ref 164–446)
PMV BLD AUTO: 11.9 FL (ref 9–12.9)
POTASSIUM SERPL-SCNC: 4.6 MMOL/L (ref 3.6–5.5)
PROT SERPL-MCNC: 7.5 G/DL (ref 6–8.2)
RBC # BLD AUTO: 4.29 M/UL (ref 4.2–5.4)
SODIUM SERPL-SCNC: 139 MMOL/L (ref 135–145)
T3 SERPL-MCNC: 140 NG/DL (ref 60–181)
T4 FREE SERPL-MCNC: 0.93 NG/DL (ref 0.93–1.7)
TRIGL SERPL-MCNC: 127 MG/DL (ref 0–149)
TSH SERPL DL<=0.005 MIU/L-ACNC: 4.32 UIU/ML (ref 0.38–5.33)
WBC # BLD AUTO: 12.1 K/UL (ref 4.8–10.8)

## 2021-04-07 ENCOUNTER — OFFICE VISIT (OUTPATIENT)
Dept: MEDICAL GROUP | Facility: CLINIC | Age: 46
End: 2021-04-07
Payer: MEDICAID

## 2021-04-07 VITALS
TEMPERATURE: 97.4 F | HEART RATE: 89 BPM | SYSTOLIC BLOOD PRESSURE: 140 MMHG | DIASTOLIC BLOOD PRESSURE: 98 MMHG | WEIGHT: 275 LBS | RESPIRATION RATE: 20 BRPM | OXYGEN SATURATION: 97 % | BODY MASS INDEX: 41.68 KG/M2 | HEIGHT: 68 IN

## 2021-04-07 DIAGNOSIS — J45.909 ASTHMA DUE TO SEASONAL ALLERGIES: ICD-10-CM

## 2021-04-07 DIAGNOSIS — J22 LOWER RESPIRATORY TRACT INFECTION: ICD-10-CM

## 2021-04-07 LAB — 25(OH)D3 SERPL-MCNC: 20 NG/ML (ref 30–80)

## 2021-04-07 PROCEDURE — 99214 OFFICE O/P EST MOD 30 MIN: CPT | Performed by: PHYSICIAN ASSISTANT

## 2021-04-07 RX ORDER — FLUCONAZOLE 100 MG/1
100 TABLET ORAL DAILY
Qty: 1 TABLET | Refills: 0 | Status: SHIPPED
Start: 2021-04-07 | End: 2021-04-14

## 2021-04-07 RX ORDER — AMOXICILLIN AND CLAVULANATE POTASSIUM 875; 125 MG/1; MG/1
1 TABLET, FILM COATED ORAL 2 TIMES DAILY
Qty: 14 TABLET | Refills: 0 | Status: SHIPPED
Start: 2021-04-07 | End: 2021-04-14

## 2021-04-07 ASSESSMENT — FIBROSIS 4 INDEX: FIB4 SCORE: 0.96

## 2021-04-08 NOTE — ASSESSMENT & PLAN NOTE
Patient was seen in the urgent care on 4/5/2021 for difficulty breathing and chest discomfort.  Patient has a history of asthma that is frequently worsened during allergy season.  Patient was started on montelukast 10 mg daily and an albuterol inhaler.  Patient states that she has been coughing up small amounts of phlegm since she started using her inhaler.  Her chest x-ray was personally reviewed by me and looks to have a small focal consolidation starting in the right lower lobe.  Radiology states that it could be focal consolidation, scarring, or a small nodule.  We are going to go ahead and treat her for a lower respiratory infection with Augmentin 875-125 mg 1 tablet twice daily.  She will continue using her albuterol inhaler as needed for her asthma and she will continue to take montelukast daily through allergy season.  I have also recommended that she start taking a long-acting antihistamine.  Patient states that she picked up some loratadine and she will start taking that on a daily basis through July to help control her allergy symptoms and asthma.  We will follow-up with her in 1 week to reassess her breathing issues.  We may repeat a chest x-ray at that time should she continue to have difficulty.

## 2021-04-08 NOTE — ASSESSMENT & PLAN NOTE
Patient currently using albuterol inhaler, montelukast 10 mg daily.  She will be starting loratadine 10 mg daily.  She will continue taking these medications through allergy season.

## 2021-04-09 ENCOUNTER — OFFICE VISIT (OUTPATIENT)
Dept: MEDICAL GROUP | Facility: MEDICAL CENTER | Age: 46
End: 2021-04-09
Attending: FAMILY MEDICINE
Payer: MEDICAID

## 2021-04-09 VITALS
WEIGHT: 270.7 LBS | HEART RATE: 97 BPM | BODY MASS INDEX: 40.09 KG/M2 | HEIGHT: 69 IN | RESPIRATION RATE: 12 BRPM | SYSTOLIC BLOOD PRESSURE: 120 MMHG | DIASTOLIC BLOOD PRESSURE: 90 MMHG | OXYGEN SATURATION: 95 % | TEMPERATURE: 96.7 F

## 2021-04-09 DIAGNOSIS — R06.02 SOB (SHORTNESS OF BREATH): ICD-10-CM

## 2021-04-09 PROCEDURE — 700101 HCHG RX REV CODE 250: Performed by: FAMILY MEDICINE

## 2021-04-09 PROCEDURE — 99213 OFFICE O/P EST LOW 20 MIN: CPT | Performed by: FAMILY MEDICINE

## 2021-04-09 PROCEDURE — 99214 OFFICE O/P EST MOD 30 MIN: CPT | Performed by: FAMILY MEDICINE

## 2021-04-09 RX ORDER — PREDNISONE 20 MG/1
40 TABLET ORAL DAILY
Qty: 8 TABLET | Refills: 0 | Status: SHIPPED | OUTPATIENT
Start: 2021-04-09 | End: 2021-04-13

## 2021-04-09 RX ORDER — TIOTROPIUM BROMIDE 18 UG/1
18 CAPSULE ORAL; RESPIRATORY (INHALATION) DAILY
Qty: 30 CAPSULE | Refills: 3 | Status: SHIPPED
Start: 2021-04-09 | End: 2021-04-23

## 2021-04-09 RX ORDER — IPRATROPIUM BROMIDE AND ALBUTEROL SULFATE 2.5; .5 MG/3ML; MG/3ML
3 SOLUTION RESPIRATORY (INHALATION) ONCE
Status: COMPLETED | OUTPATIENT
Start: 2021-04-09 | End: 2021-04-09

## 2021-04-09 RX ORDER — IPRATROPIUM BROMIDE 17 UG/1
2 AEROSOL, METERED RESPIRATORY (INHALATION) EVERY 6 HOURS PRN
Qty: 1 EACH | Refills: 3 | Status: SHIPPED
Start: 2021-04-09 | End: 2021-04-23

## 2021-04-09 RX ADMIN — IPRATROPIUM BROMIDE AND ALBUTEROL SULFATE 3 ML: .5; 3 SOLUTION RESPIRATORY (INHALATION) at 18:40

## 2021-04-09 ASSESSMENT — FIBROSIS 4 INDEX: FIB4 SCORE: 0.96

## 2021-04-09 NOTE — ASSESSMENT & PLAN NOTE
Pt has had 2 week course of shortness of breath.  She went to urgent care twice, the first time had an x-ray done, showing a right lower lobe density representing scarring, focal infection, or nodule.  She was given a albuterol inhaler at the time and allergy medications, also was given a 3-day course of steroids, which she states helped her significantly.  She ran out 2 days ago and yesterday was her first day without steroids and felt all of her symptoms come back.  On her follow-up appointment she was still having symptoms of was treated with a course of Augmentin for community-acquired pneumonia.  No cough  Body aches decreased appetite  No sinus congestion  On singulair and claritin  Had covid 03/2020 - SOB would be temporary when changing temperatures. This time it has been sticking around.  Thinks it might have been allergy induced when the wind picked up 1 week ago   No fevers/chills  Dryness in the sinuses

## 2021-04-10 NOTE — PROGRESS NOTES
Subjective:     CC:    Chief Complaint   Patient presents with   • Shortness of Breath     2 weeks       HISTORY OF THE PRESENT ILLNESS: Patient is a 45 y.o. female. This pleasant patient is here today to establish care and discuss the following issues.     SOB (shortness of breath)  Pt has had 2 week course of shortness of breath.  She went to urgent care twice, the first time had an x-ray done, showing a right lower lobe density representing scarring, focal infection, or nodule.  She was given a albuterol inhaler at the time and allergy medications, also was given a 3-day course of steroids, which she states helped her significantly.  She ran out 2 days ago and yesterday was her first day without steroids and felt all of her symptoms come back.  On her follow-up appointment she was still having symptoms of was treated with a course of Augmentin for community-acquired pneumonia.  No cough  Body aches decreased appetite  No sinus congestion  On singulair and claritin  Had covid 03/2020 - SOB would be temporary when changing temperatures. This time it has been sticking around.  Thinks it might have been allergy induced when the wind picked up 1 week ago   No fevers/chills  Dryness in the sinuses       Allergies: Fentanyl and Tramadol    Current Outpatient Medications Ordered in Epic   Medication Sig Dispense Refill   • predniSONE (DELTASONE) 20 MG Tab Take 2 Tablets by mouth every day for 4 days. 8 tablet 0   • tiotropium (SPIRIVA HANDIHALER) 18 MCG Cap Place 1 capsule into inhaler and inhale every day. 30 capsule 3   • ipratropium (ATROVENT HFA) 17 MCG/ACT Aero Soln Inhale 2 Puffs every 6 hours as needed. 1 Each 3   • amoxicillin-clavulanate (AUGMENTIN) 875-125 MG Tab Take 1 tablet by mouth 2 times a day. 14 tablet 0   • fluconazole (DIFLUCAN) 100 MG Tab Take 1 tablet by mouth every day. 1 tablet 0   • montelukast (SINGULAIR) 10 MG Tab Take 1 tablet by mouth every evening. 30 tablet 1   • albuterol 108 (90 Base)  MCG/ACT Aero Soln inhalation aerosol Inhale 2 Puffs every 6 hours as needed for Shortness of Breath. 8.5 g 3   • sertraline (ZOLOFT) 25 MG tablet Take 1 tablet by mouth every day. 30 tablet 0   • Omega-3 Fatty Acids (FISH OIL) 1000 MG Cap capsule Take 1,000 mg by mouth 1 time daily as needed.     • Methylsulfonylmethane (MSM PO) Take  by mouth.     • POTASSIUM PO Take  by mouth.       Current Facility-Administered Medications Ordered in Epic   Medication Dose Route Frequency Provider Last Rate Last Admin   • ipratropium-albuterol (DUONEB) nebulizer solution  3 mL Nebulization Once Odilia Engel M.D.           Past Medical History:   Diagnosis Date   • Allergy     seasonal, meds   • Anxiety    • Arrhythmia    • Asthma     as a child   • Depression    • Diabetes (HCC)     Prediabetes   • GERD (gastroesophageal reflux disease)    • Head ache     with her period   • Hypertension    • Migraine     stopped when she moved out of [place], possibly allergy related   • Pain of right eye 6/17/2016   • Thyroid disease    • Wart 6/17/2016       Past Surgical History:   Procedure Laterality Date   • CARPAL TUNNEL RELEASE Right 2012   • CHOLECYSTECTOMY  2011       Social History     Tobacco Use   • Smoking status: Former Smoker     Packs/day: 1.00     Years: 35.00     Pack years: 35.00     Start date: 1991   • Smokeless tobacco: Never Used   Substance Use Topics   • Alcohol use: No     Alcohol/week: 0.0 oz   • Drug use: Never     Frequency: 3.0 times per week     Comment: tincture       Social History     Social History Narrative   • Not on file       Family History   Problem Relation Age of Onset   • Lung Disease Mother         COPD   • Hypertension Mother         Pulmonary hypertension   • Arthritis Mother    • Cancer Mother         melanoma (skin)   • Lupus Mother    • Mult Sclerosis Father    • Hypertension Brother    • Alcohol abuse Brother    • Cancer Maternal Grandmother         Uterine   • Diabetes Maternal Grandfather   "  • Heart Disease Maternal Grandfather    • Hypertension Maternal Grandfather    • Hyperlipidemia Maternal Grandfather    • Heart Attack Maternal Grandfather    • No Known Problems Paternal Grandfather              Objective:     Exam: /90 (BP Location: Right arm, Patient Position: Sitting, BP Cuff Size: Adult long)   Pulse 97   Temp 35.9 °C (96.7 °F) (Temporal)   Resp 12   Ht 1.753 m (5' 9\")   Wt 123 kg (270 lb 11.2 oz)   SpO2 95%  Body mass index is 39.98 kg/m².    General: Normal appearing. No distress.  Head: normocephalic  Neck: Supple. Thyroid is not enlarged.  Pulmonary: Patient is mildly tachypneic, able to speak in full sentences, however is intermittently needing to take a deep breath.  Cardiovascular: Regular rate and rhythm without murmur. Radial pulses are intact and equal bilaterally.  Abdomen: Soft, nontender, nondistended. Normal bowel sounds.  Neurologic: no facial droop, gait normal  Lymph: No cervical or supraclavicular lymph nodes are palpable  Skin: Warm and dry.  No obvious lesions.  Musculoskeletal: Normal gait. No extremity cyanosis, clubbing, or edema.  Psych: Normal mood and affect. Alert and oriented x3. Judgment and insight is normal.      Labs:   Reviewed labs 4/5/21 -mildly elevated white count, LDL mildly elevated, HDL is low, TSH normal  Reviewed echo from 4/30/2020-normal    Assessment & Plan:   45 y.o. female with the following -    1. SOB (shortness of breath)  - predniSONE (DELTASONE) 20 MG Tab; Take 2 Tablets by mouth every day for 4 days.  Dispense: 8 tablet; Refill: 0  - DX-CHEST-2 VIEWS; Future  - tiotropium (SPIRIVA HANDIHALER) 18 MCG Cap; Place 1 capsule into inhaler and inhale every day.  Dispense: 30 capsule; Refill: 3  - ipratropium (ATROVENT HFA) 17 MCG/ACT Aero Soln; Inhale 2 Puffs every 6 hours as needed.  Dispense: 1 Each; Refill: 3  Patient states that the albuterol has not been helping her.  We will try to DuoNeb today in clinic, which she states did " help her open up her chest a little bit and she is noted to be breathing a little bit more comfortably.  Not sure what is going on, possibly post Covid syndrome exacerbated by an acute infection versus pneumonia that is only on day #3 of treatment versus asthma.  Since patient was feeling better with steroids, will repeat a 4-day burst of steroids on prednisone 40 mg.  Since she did do better on ipratropium in clinic, will give Atrovent inhaler as needed, also start on Spiriva.  Instructed patient to continue Singulair and Claritin.  Repeat chest x-rays in 2 days.  We will follow-up next week to see how she is doing.  Patient given strong ER precautions.    Other orders  - ipratropium-albuterol (DUONEB) nebulizer solution      Return in about 5 days (around 4/14/2021).    Please note that this dictation was created using voice recognition software. I have made every reasonable attempt to correct obvious errors, but I expect that there are errors of grammar and possibly content that I did not discover before finalizing the note.

## 2021-04-13 ENCOUNTER — HOSPITAL ENCOUNTER (OUTPATIENT)
Dept: RADIOLOGY | Facility: MEDICAL CENTER | Age: 46
End: 2021-04-13
Attending: FAMILY MEDICINE
Payer: MEDICAID

## 2021-04-13 DIAGNOSIS — R06.02 SOB (SHORTNESS OF BREATH): ICD-10-CM

## 2021-04-13 PROCEDURE — 71046 X-RAY EXAM CHEST 2 VIEWS: CPT

## 2021-04-14 ENCOUNTER — TELEMEDICINE (OUTPATIENT)
Dept: MEDICAL GROUP | Facility: MEDICAL CENTER | Age: 46
End: 2021-04-14
Attending: FAMILY MEDICINE
Payer: MEDICAID

## 2021-04-14 VITALS — BODY MASS INDEX: 40.16 KG/M2 | HEIGHT: 69 IN | WEIGHT: 271.17 LBS

## 2021-04-14 DIAGNOSIS — J45.909 ASTHMA DUE TO SEASONAL ALLERGIES: ICD-10-CM

## 2021-04-14 DIAGNOSIS — R91.1 SOLITARY LUNG NODULE: ICD-10-CM

## 2021-04-14 DIAGNOSIS — R06.02 SOB (SHORTNESS OF BREATH): ICD-10-CM

## 2021-04-14 PROCEDURE — 99214 OFFICE O/P EST MOD 30 MIN: CPT | Mod: CR | Performed by: FAMILY MEDICINE

## 2021-04-14 ASSESSMENT — ENCOUNTER SYMPTOMS
SHORTNESS OF BREATH: 1
WHEEZING: 0

## 2021-04-14 ASSESSMENT — FIBROSIS 4 INDEX: FIB4 SCORE: 0.96

## 2021-04-14 NOTE — PROGRESS NOTES
Telemedicine: Established Patient   This evaluation was conducted via Zoom using secure and encrypted videoconferencing technology. The patient was in a private location in the state of [place].    The patient's identity was confirmed and verbal consent was obtained for this virtual visit.    Subjective:   CC:   Chief Complaint   Patient presents with   • Follow-Up   • Lab Results       Rina Roger is a 45 y.o. female presenting for evaluation and management of:    SOB (shortness of breath)  Does well in the morning, but when she opens up the house when it gets warm she gets a little worse and goes away with the steroid. She has been out of steroids since yesterday. Yesterday was cold and rainy and she felt better. Overall she states she is feeling better  Taking spiriva daily  Taking ipratropium PRN, which is helpful   Taking singulair, claritin, and benadryl nightly   Albuterol was making her jittery and didn't help, hasn't been using it.   Completed amoxicillin last night  She states this happened last year as well in the spring         Review of Systems   Respiratory: Positive for shortness of breath (improved). Negative for wheezing.    Cardiovascular: Negative for chest pain.         Allergies   Allergen Reactions   • Fentanyl Shortness of Breath   • Tramadol Vomiting and Nausea       Current medicines (including changes today)  Current Outpatient Medications   Medication Sig Dispense Refill   • tiotropium (SPIRIVA HANDIHALER) 18 MCG Cap Place 1 capsule into inhaler and inhale every day. 30 capsule 3   • ipratropium (ATROVENT HFA) 17 MCG/ACT Aero Soln Inhale 2 Puffs every 6 hours as needed. 1 Each 3   • montelukast (SINGULAIR) 10 MG Tab Take 1 tablet by mouth every evening. 30 tablet 1   • albuterol 108 (90 Base) MCG/ACT Aero Soln inhalation aerosol Inhale 2 Puffs every 6 hours as needed for Shortness of Breath. 8.5 g 3   • sertraline (ZOLOFT) 25 MG tablet Take 1 tablet by mouth every day. 30 tablet 0    • Omega-3 Fatty Acids (FISH OIL) 1000 MG Cap capsule Take 1,000 mg by mouth 1 time daily as needed.     • Methylsulfonylmethane (MSM PO) Take  by mouth.     • POTASSIUM PO Take  by mouth.       No current facility-administered medications for this visit.       Patient Active Problem List    Diagnosis Date Noted   • SOB (shortness of breath) 04/09/2021   • Lower respiratory tract infection 04/07/2021   • Asthma due to seasonal allergies 04/07/2021   • Prediabetes 04/01/2021   • History of posttraumatic stress disorder (PTSD) 04/01/2021   • Major depressive disorder 04/01/2021   • Vitamin D deficiency 04/01/2021   • Perimenopausal symptoms 04/01/2021   • Palpitations 04/13/2020   • Morbid obesity with BMI of 40.0-44.9, adult (HCC) 05/30/2017   • Hypothyroid 05/17/2016   • Family history of diabetes mellitus (DM) 03/31/2016   • Chronic low back pain 03/31/2016   • Upper back pain 03/31/2016   • Insomnia 03/31/2016       Family History   Problem Relation Age of Onset   • Lung Disease Mother         COPD   • Hypertension Mother         Pulmonary hypertension   • Arthritis Mother    • Cancer Mother         melanoma (skin)   • Lupus Mother    • Mult Sclerosis Father    • Hypertension Brother    • Alcohol abuse Brother    • Cancer Maternal Grandmother         Uterine   • Diabetes Maternal Grandfather    • Heart Disease Maternal Grandfather    • Hypertension Maternal Grandfather    • Hyperlipidemia Maternal Grandfather    • Heart Attack Maternal Grandfather    • No Known Problems Paternal Grandfather        She  has a past medical history of Allergy, Anxiety, Arrhythmia, Asthma, Depression, Diabetes ([facility]), GERD (gastroesophageal reflux disease), Head ache, Hypertension, Migraine, Pain of right eye (6/17/2016), Thyroid disease, and Wart (6/17/2016). She also has no past medical history of Anemia, Blood transfusion without reported diagnosis, Cancer ([facility]), Clotting disorder ([facility]), COPD (chronic obstructive pulmonary disease)  "(HCC), Diabetic neuropathy (HCC), Heart attack (HCC), Heart murmur, Hyperlipidemia, IBD (inflammatory bowel disease), Kidney disease, Pulmonary emphysema (HCC), Seizure (HCC), Stroke (HCC), or Substance abuse (HCC).  She  has a past surgical history that includes carpal tunnel release (Right, 2012) and cholecystectomy (2011).       Objective:   Ht 1.753 m (5' 9.02\")   Wt 123 kg (271 lb 2.7 oz)   LMP 03/24/2021 (Within Days)   BMI 40.03 kg/m²     Physical Exam   Constitutional: Alert, no distress, breathing comfortably   Skin: no lesions  Respiratory: Unlabored respiratory effort, no cough.  MSK: moves all extremities.  Neuro: no facial droop  Psych: Alert and oriented x3, normal affect and mood.    Lab:   Reviewed repeat cxr - stable opacity/nodule in RLL    Assessment and Plan:   The following treatment plan was discussed:     1. SOB (shortness of breath)  - CT-CHEST (THORAX) W/O; Future  - REFERRAL TO ALLERGY  F/u abnormal CXR with CT  Pt improving, since it seems to be related to weather and patient is already on a good regimen for allergy control, will send to allergist for further evaluation   Recommend dusting the house, consider air filter?    2. Solitary lung nodule  - CT-CHEST (THORAX) W/O; Future  F/u nodule/opacity in RLL    3. Asthma due to seasonal allergies  - REFERRAL TO ALLERGY        Follow-up: Return in about 1 week (around 4/21/2021) for virtual f/u sob.         "

## 2021-04-14 NOTE — ASSESSMENT & PLAN NOTE
Does well in the morning, but when she opens up the house when it gets warm she gets a little worse and goes away with the steroid. She has been out of steroids since yesterday. Yesterday was cold and rainy and she felt better. Overall she states she is feeling better  Taking spiriva daily  Taking ipratropium PRN, which is helpful   Taking singulair, claritin, and benadryl nightly   Albuterol was making her jittery and didn't help, hasn't been using it.   Completed amoxicillin last night  She states this happened last year as well in the spring

## 2021-04-20 ENCOUNTER — HOSPITAL ENCOUNTER (OUTPATIENT)
Dept: RADIOLOGY | Facility: MEDICAL CENTER | Age: 46
End: 2021-04-20
Attending: FAMILY MEDICINE
Payer: MEDICAID

## 2021-04-20 DIAGNOSIS — R06.02 SOB (SHORTNESS OF BREATH): ICD-10-CM

## 2021-04-20 DIAGNOSIS — R91.1 SOLITARY LUNG NODULE: ICD-10-CM

## 2021-04-20 PROCEDURE — 71250 CT THORAX DX C-: CPT

## 2021-04-21 DIAGNOSIS — R91.1 SOLITARY PULMONARY NODULE: ICD-10-CM

## 2021-04-23 ENCOUNTER — TELEMEDICINE (OUTPATIENT)
Dept: MEDICAL GROUP | Facility: MEDICAL CENTER | Age: 46
End: 2021-04-23
Attending: FAMILY MEDICINE
Payer: MEDICAID

## 2021-04-23 VITALS — BODY MASS INDEX: 40.16 KG/M2 | HEIGHT: 69 IN | WEIGHT: 271.17 LBS

## 2021-04-23 DIAGNOSIS — R06.02 SOB (SHORTNESS OF BREATH): ICD-10-CM

## 2021-04-23 DIAGNOSIS — R91.8 PULMONARY NODULES: ICD-10-CM

## 2021-04-23 PROCEDURE — 99214 OFFICE O/P EST MOD 30 MIN: CPT | Mod: CR | Performed by: FAMILY MEDICINE

## 2021-04-23 RX ORDER — FUROSEMIDE 20 MG/1
10 TABLET ORAL DAILY
Qty: 15 TABLET | Refills: 1 | Status: SHIPPED | OUTPATIENT
Start: 2021-04-23 | End: 2021-05-26 | Stop reason: SDUPTHER

## 2021-04-23 ASSESSMENT — FIBROSIS 4 INDEX: FIB4 SCORE: 0.96

## 2021-04-24 NOTE — PROGRESS NOTES
Telemedicine: Established Patient   This evaluation was conducted via Zoom using secure and encrypted videoconferencing technology. The patient was in a private location in the state of [place].    The patient's identity was confirmed and verbal consent was obtained for this virtual visit.    Subjective:   CC:   Chief Complaint   Patient presents with   • Follow-Up     water pill stopped the breathing problem    • Lab Results     CT scan        Rina Roger is a 45 y.o. female presenting for evaluation and management of:    SOB (shortness of breath)  Patient states that she recalled that this happened once last year in April, where she had shortness of breath.  She went to an urgent care and was given Lasix and albuterol.  She recalls that after she took the Lasix that her shortness of breath did improve.  She did find the pills again, she thinks her 20 mg tablets.  She has been taking them for the last week, 10 mg once a day in the morning, and she states that this is sufficient to keep her comfortable throughout the day.  She has not really been using her inhalers at all.  Her CT scan did not show any pleural effusions, however this was done after she had started the Lasix.  She states she has also been taking over-the-counter potassium, which is a usual supplement for her.    Pulmonary nodules  These are found on her CT scan recently.  Was recommended to follow-up scan in 3 to 6 months.  Patient is high risk due to history of smoking.            Allergies   Allergen Reactions   • Quinine    • Fentanyl Shortness of Breath   • Tramadol Vomiting and Nausea       Current medicines (including changes today)  Current Outpatient Medications   Medication Sig Dispense Refill   • furosemide (LASIX) 20 MG Tab Take 0.5 Tablets by mouth every day. 15 tablet 1   • montelukast (SINGULAIR) 10 MG Tab Take 1 tablet by mouth every evening. 30 tablet 1   • albuterol 108 (90 Base) MCG/ACT Aero Soln inhalation aerosol Inhale 2  Puffs every 6 hours as needed for Shortness of Breath. 8.5 g 3   • sertraline (ZOLOFT) 25 MG tablet Take 1 tablet by mouth every day. 30 tablet 0   • Omega-3 Fatty Acids (FISH OIL) 1000 MG Cap capsule Take 1,000 mg by mouth 1 time daily as needed.     • Methylsulfonylmethane (MSM PO) Take  by mouth.     • POTASSIUM PO Take  by mouth.       No current facility-administered medications for this visit.       Patient Active Problem List    Diagnosis Date Noted   • Pulmonary nodules 04/23/2021   • SOB (shortness of breath) 04/09/2021   • Lower respiratory tract infection 04/07/2021   • Asthma due to seasonal allergies 04/07/2021   • Prediabetes 04/01/2021   • History of posttraumatic stress disorder (PTSD) 04/01/2021   • Major depressive disorder 04/01/2021   • Vitamin D deficiency 04/01/2021   • Perimenopausal symptoms 04/01/2021   • Palpitations 04/13/2020   • Morbid obesity with BMI of 40.0-44.9, adult (HCC) 05/30/2017   • Hypothyroid 05/17/2016   • Family history of diabetes mellitus (DM) 03/31/2016   • Chronic low back pain 03/31/2016   • Upper back pain 03/31/2016   • Insomnia 03/31/2016       Family History   Problem Relation Age of Onset   • Lung Disease Mother         COPD   • Hypertension Mother         Pulmonary hypertension   • Arthritis Mother    • Cancer Mother         melanoma (skin)   • Lupus Mother    • Mult Sclerosis Father    • Hypertension Brother    • Alcohol abuse Brother    • Cancer Maternal Grandmother         Uterine   • Diabetes Maternal Grandfather    • Heart Disease Maternal Grandfather    • Hypertension Maternal Grandfather    • Hyperlipidemia Maternal Grandfather    • Heart Attack Maternal Grandfather    • No Known Problems Paternal Grandfather        She  has a past medical history of Allergy, Anxiety, Arrhythmia, Asthma, Depression, Diabetes (HCC), GERD (gastroesophageal reflux disease), Head ache, Hypertension, Migraine, Pain of right eye (6/17/2016), Thyroid disease, and Wart  "(6/17/2016). She also has no past medical history of Anemia, Blood transfusion without reported diagnosis, Cancer (HCC), Clotting disorder (HCC), COPD (chronic obstructive pulmonary disease) (HCC), Diabetic neuropathy (HCC), Heart attack (HCC), Heart murmur, Hyperlipidemia, IBD (inflammatory bowel disease), Kidney disease, Pulmonary emphysema (HCC), Seizure (HCC), Stroke (HCC), or Substance abuse (HCC).  She  has a past surgical history that includes carpal tunnel release (Right, 2012) and cholecystectomy (2011).       Objective:   Ht 1.753 m (5' 9.02\")   Wt 123 kg (271 lb 2.7 oz)   LMP 03/24/2021 (Within Days)   BMI 40.03 kg/m²     Physical Exam   Constitutional: Alert, no distress, well-groomed.  Skin: no lesions  Eye: Conjunctivae are normal  ENMT: Lips without lesions  Neck: Trachea midline  Respiratory: Unlabored respiratory effort, no cough.  MSK: moves all extremities.  Neuro: no facial droop  Psych: Alert and oriented x3, normal affect and mood.      Assessment and Plan:   The following treatment plan was discussed:     1. SOB (shortness of breath)  - furosemide (LASIX) 20 MG Tab; Take 0.5 Tablets by mouth every day.  Dispense: 15 tablet; Refill: 1  - Basic Metabolic Panel; Future  Since the inhalers were not working for the patient, I have canceled them.   will continue Lasix half a tablet 10 mg daily.  I advised her to get a BMP done in about a week, as she would have been on it for about 2 weeks at this point.  Advised her not to increase the dose without telling me, however if she feels like she can try without the medication she can and let me know what happens.    2. Pulmonary nodules  Follow-up CT scan has already been ordered, patient told to schedule in about 3 to 6 months.      Follow-up: Return in about 3 weeks (around 5/14/2021) for f/u sob.         "

## 2021-04-24 NOTE — ASSESSMENT & PLAN NOTE
Patient states that she recalled that this happened once last year in April, where she had shortness of breath.  She went to an urgent care and was given Lasix and albuterol.  She recalls that after she took the Lasix that her shortness of breath did improve.  She did find the pills again, she thinks her 20 mg tablets.  She has been taking them for the last week, 10 mg once a day in the morning, and she states that this is sufficient to keep her comfortable throughout the day.  She has not really been using her inhalers at all.  Her CT scan did not show any pleural effusions, however this was done after she had started the Lasix.  She states she has also been taking over-the-counter potassium, which is a usual supplement for her.

## 2021-04-24 NOTE — ASSESSMENT & PLAN NOTE
These are found on her CT scan recently.  Was recommended to follow-up scan in 3 to 6 months.  Patient is high risk due to history of smoking.

## 2021-04-27 DIAGNOSIS — R06.02 SOB (SHORTNESS OF BREATH): ICD-10-CM

## 2021-04-29 ENCOUNTER — HOSPITAL ENCOUNTER (OUTPATIENT)
Dept: RADIOLOGY | Facility: MEDICAL CENTER | Age: 46
End: 2021-04-29
Attending: PHYSICIAN ASSISTANT
Payer: MEDICAID

## 2021-04-29 DIAGNOSIS — Z12.31 ENCOUNTER FOR SCREENING MAMMOGRAM FOR BREAST CANCER: ICD-10-CM

## 2021-04-29 PROCEDURE — 77063 BREAST TOMOSYNTHESIS BI: CPT

## 2021-05-06 DIAGNOSIS — R06.02 SOB (SHORTNESS OF BREATH): ICD-10-CM

## 2021-05-12 ENCOUNTER — NON-PROVIDER VISIT (OUTPATIENT)
Dept: CARDIOLOGY | Facility: MEDICAL CENTER | Age: 46
End: 2021-05-12
Payer: MEDICAID

## 2021-05-12 DIAGNOSIS — R00.2 PALPITATIONS: ICD-10-CM

## 2021-05-12 DIAGNOSIS — R00.0 TACHYCARDIA: ICD-10-CM

## 2021-05-12 PROCEDURE — 93224 XTRNL ECG REC UP TO 48 HRS: CPT | Performed by: INTERNAL MEDICINE

## 2021-05-18 ENCOUNTER — OFFICE VISIT (OUTPATIENT)
Dept: SLEEP MEDICINE | Facility: MEDICAL CENTER | Age: 46
End: 2021-05-18
Payer: MEDICAID

## 2021-05-18 ENCOUNTER — HOSPITAL ENCOUNTER (OUTPATIENT)
Dept: LAB | Facility: MEDICAL CENTER | Age: 46
End: 2021-05-18
Attending: FAMILY MEDICINE
Payer: MEDICAID

## 2021-05-18 VITALS
BODY MASS INDEX: 41.34 KG/M2 | RESPIRATION RATE: 16 BRPM | HEIGHT: 69 IN | WEIGHT: 279.1 LBS | OXYGEN SATURATION: 96 % | DIASTOLIC BLOOD PRESSURE: 70 MMHG | HEART RATE: 92 BPM | SYSTOLIC BLOOD PRESSURE: 124 MMHG

## 2021-05-18 DIAGNOSIS — R06.02 SOB (SHORTNESS OF BREATH): Primary | ICD-10-CM

## 2021-05-18 DIAGNOSIS — Z87.891 FORMER SMOKER: ICD-10-CM

## 2021-05-18 DIAGNOSIS — R91.8 PULMONARY NODULES: ICD-10-CM

## 2021-05-18 DIAGNOSIS — R06.02 SOB (SHORTNESS OF BREATH): ICD-10-CM

## 2021-05-18 LAB
ANION GAP SERPL CALC-SCNC: 12 MMOL/L (ref 7–16)
BUN SERPL-MCNC: 19 MG/DL (ref 8–22)
CALCIUM SERPL-MCNC: 10 MG/DL (ref 8.5–10.5)
CHLORIDE SERPL-SCNC: 106 MMOL/L (ref 96–112)
CO2 SERPL-SCNC: 24 MMOL/L (ref 20–33)
CREAT SERPL-MCNC: 0.68 MG/DL (ref 0.5–1.4)
GLUCOSE SERPL-MCNC: 104 MG/DL (ref 65–99)
POTASSIUM SERPL-SCNC: 4.3 MMOL/L (ref 3.6–5.5)
SODIUM SERPL-SCNC: 142 MMOL/L (ref 135–145)

## 2021-05-18 PROCEDURE — 80048 BASIC METABOLIC PNL TOTAL CA: CPT

## 2021-05-18 PROCEDURE — 86635 COCCIDIOIDES ANTIBODY: CPT

## 2021-05-18 PROCEDURE — 99244 OFF/OP CNSLTJ NEW/EST MOD 40: CPT | Performed by: INTERNAL MEDICINE

## 2021-05-18 PROCEDURE — 36415 COLL VENOUS BLD VENIPUNCTURE: CPT

## 2021-05-18 RX ORDER — TIOTROPIUM BROMIDE 18 UG/1
18 CAPSULE ORAL; RESPIRATORY (INHALATION) DAILY
COMMUNITY
End: 2021-05-26 | Stop reason: SDUPTHER

## 2021-05-18 RX ORDER — IPRATROPIUM BROMIDE 17 UG/1
2 AEROSOL, METERED RESPIRATORY (INHALATION) EVERY 6 HOURS
Status: ON HOLD | COMMUNITY
End: 2022-01-21

## 2021-05-18 ASSESSMENT — FIBROSIS 4 INDEX: FIB4 SCORE: 0.96

## 2021-05-18 ASSESSMENT — ENCOUNTER SYMPTOMS
HEMOPTYSIS: 0
SHORTNESS OF BREATH: 1
CHEST TIGHTNESS: 1
RESPIRATORY SYMPTOMS COMMENTS: NO
DYSPNEA AT REST: 1
WHEEZING: 0

## 2021-05-19 NOTE — PROGRESS NOTES
Chief Complaint   Patient presents with   • New Patient     NP referred 4/5/21 by Shorty Chacon M.D. for SOB,Abnormal chest x-ray       HPI: This patient is a 45 y.o. female who is referred for shortness of breath.  Over the past year she has developed intermittent severe exertion dyspnea.  Triggers include weather changes, wind and being outdoors.  She had been working as a  and commuting between [facility] as well as [place].  She has gone to Urgent Care and has been treated with diuretics, albuterol inhaler as well as steroids.  She felt steroids are highly effective, however symptoms recur after she weans off steroids.  Describes recent  fever, chills and URI symptoms.  Denies cough, wheezing or weight loss.  She has associated chest tightness.  She is a former smoker of 35 pack years, quit in 2011.  Chest CAT scan April 2021 was reviewed and shows a 1.2 cm noncalcified pulmonary nodule in the right middle lobe with multiple satellite nodules.  No gross lymphadenopathy or infiltrates appreciated.      Past Medical History:   Diagnosis Date   • Allergy     seasonal, meds   • Anxiety    • Arrhythmia    • Asthma     as a child   • Depression    • Diabetes (HCC)     Prediabetes   • GERD (gastroesophageal reflux disease)    • Head ache     with her period   • Hypertension    • Migraine     stopped when she moved out of [place], possibly allergy related   • Pain of right eye 6/17/2016   • Painful breathing    • Shortness of breath    • Thyroid disease    • Wart 6/17/2016       Social History     Socioeconomic History   • Marital status:      Spouse name: eva Roger   • Number of children: 1   • Years of education: Not on file   • Highest education level: 11th grade   Occupational History   • Occupation: dispatcher     Employer: OTHER     Comment: self employed   Tobacco Use   • Smoking status: Former Smoker     Packs/day: 1.00     Years: 35.00     Pack years: 35.00     Start date:  1991   • Smokeless tobacco: Never Used   Vaping Use   • Vaping Use: Never used   Substance and Sexual Activity   • Alcohol use: No     Alcohol/week: 0.0 oz   • Drug use: Never     Frequency: 3.0 times per week     Comment: tincture   • Sexual activity: Yes     Partners: Male, Female     Birth control/protection: None   Other Topics Concern   • Not on file   Social History Narrative   • Not on file     Social Determinants of Health     Financial Resource Strain: Medium Risk   • Difficulty of Paying Living Expenses: Somewhat hard   Food Insecurity: Food Insecurity Present   • Worried About Running Out of Food in the Last Year: Sometimes true   • Ran Out of Food in the Last Year: Never true   Transportation Needs: No Transportation Needs   • Lack of Transportation (Medical): No   • Lack of Transportation (Non-Medical): No   Physical Activity: Sufficiently Active   • Days of Exercise per Week: 3 days   • Minutes of Exercise per Session: 50 min   Stress: Stress Concern Present   • Feeling of Stress : Rather much   Social Connections: Moderately Isolated   • Frequency of Communication with Friends and Family: More than three times a week   • Frequency of Social Gatherings with Friends and Family: Never   • Attends [employer] Services: Never   • Active Member of Clubs or Organizations: No   • Attends Club or Organization Meetings: Never   • Marital Status:    Intimate Partner Violence:    • Fear of Current or Ex-Partner:    • Emotionally Abused:    • Physically Abused:    • Sexually Abused:        Family History   Problem Relation Age of Onset   • Lung Disease Mother         COPD   • Hypertension Mother         Pulmonary hypertension   • Arthritis Mother    • Cancer Mother         melanoma (skin)   • Lupus Mother    • Mult Sclerosis Father    • Hypertension Brother    • Alcohol abuse Brother    • Cancer Maternal Grandmother         Uterine   • Diabetes Maternal Grandfather    • Heart Disease Maternal Grandfather    •  "Hypertension Maternal Grandfather    • Hyperlipidemia Maternal Grandfather    • Heart Attack Maternal Grandfather    • No Known Problems Paternal Grandfather        Current Outpatient Medications on File Prior to Visit   Medication Sig Dispense Refill   • tiotropium (SPIRIVA HANDIHALER) 18 MCG Cap Place 18 mcg into inhaler and inhale every day.     • ipratropium (ATROVENT HFA) 17 MCG/ACT Aero Soln Inhale 2 Puffs every 6 hours.     • furosemide (LASIX) 20 MG Tab Take 0.5 Tablets by mouth every day. 15 tablet 1   • albuterol 108 (90 Base) MCG/ACT Aero Soln inhalation aerosol Inhale 2 Puffs every 6 hours as needed for Shortness of Breath. 8.5 g 3   • Omega-3 Fatty Acids (FISH OIL) 1000 MG Cap capsule Take 1,000 mg by mouth 1 time daily as needed.     • Methylsulfonylmethane (MSM PO) Take  by mouth.     • POTASSIUM PO Take  by mouth.     • montelukast (SINGULAIR) 10 MG Tab Take 1 tablet by mouth every evening. 30 tablet 1   • sertraline (ZOLOFT) 25 MG tablet Take 1 tablet by mouth every day. 30 tablet 0     No current facility-administered medications on file prior to visit.       Allergies: Quinine, Fentanyl, and Tramadol    ROS:   Constitutional: As in HPI  Eyes: Denies vision loss, pain, drainage, double vision  Ears, Nose, Throat: Denies earache, difficulty hearing, tinnitus, +nasal congestion, denies hoarseness  Cardiovascular: Denies chest pain, tightness, palpitations, orthopnea or edema  Respiratory: As in HPI  Sleep: Denies daytime sleepiness, snoring, apneas, insomnia, morning headaches  GI: Denies heartburn, dysphagia, nausea, abdominal pain, diarrhea or constipation  : Denies frequent urination, hematuria, discharge or painful urination  Musculoskeletal: Denies back pain, painful joints, sore muscles  Neurological: Denies weakness or headaches  Skin: No rashes    /70   Pulse 92   Resp 16   Ht 1.753 m (5' 9\")   Wt (!) 127 kg (279 lb 1.6 oz)   SpO2 96%     Physical Exam:  Appearance: " Well-nourished, well-developed, in no acute distress  HEENT: Normocephalic, atraumatic, white sclera, PERRLA, oropharynx clear  Neck: No adenopathy or masses  Respiratory: no intercostal retractions or accessory muscle use  Lungs auscultation: Clear to auscultation bilaterally  Cardiovascular: Regular rate rhythm. No murmurs, rubs or gallops.  No LE edema  Abdomen: soft, nondistended  Gait: Normal  Digits: No clubbing, cyanosis  Motor: No focal deficits  Orientation: Oriented to time, person and place    Diagnosis:  1. SOB (shortness of breath)  SARS-CoV-2 PCR (24 hour In-House): Collect NP swab in VTM    CANCELED: SARS-CoV-2 PCR (24 hour In-House): Collect NP swab in VTM   2. Pulmonary nodules  [id number] BASE TO MID-THIGH    SARS-CoV-2 PCR (24 hour In-House): Collect NP swab in VTM   3. Former smoker         Plan:  The patient describes intermittent exertional dyspnea and chest tightness.  Chest CAT scan identifies multiple pulmonary nodules in the right middle lobe, the largest measuring 1.2 cm ?  Inflammatory versus malignant.  She has traveled to regions with high incidence of Coccidiomycosis.  Symptoms may be secondary to infection, asthma, less likely malignancy.  Recommend Cocci serology as well as Covid testing.  Proceed with PET scan for evaluation pulmonary nodules. If demonstrate FDG uptake, then biopsy/resection would be indicated.  Obtain pulmonary function testing once Covid status confirmed negative.  Continue Spiriva and Albuterol/Atrovent inhalers in interim.  No follow-ups on file.

## 2021-05-21 LAB
COCCIDIOIDES IGG SER-ACNC: 0.2 IV
COCCIDIOIDES IGM SERPL-ACNC: 0.6 IV

## 2021-05-24 DIAGNOSIS — R00.2 PALPITATIONS: ICD-10-CM

## 2021-05-25 LAB — EKG IMPRESSION: NORMAL

## 2021-05-26 ENCOUNTER — OFFICE VISIT (OUTPATIENT)
Dept: MEDICAL GROUP | Facility: MEDICAL CENTER | Age: 46
End: 2021-05-26
Attending: FAMILY MEDICINE
Payer: MEDICAID

## 2021-05-26 VITALS
WEIGHT: 283.9 LBS | OXYGEN SATURATION: 96 % | RESPIRATION RATE: 16 BRPM | BODY MASS INDEX: 42.05 KG/M2 | HEIGHT: 69 IN | DIASTOLIC BLOOD PRESSURE: 82 MMHG | HEART RATE: 85 BPM | SYSTOLIC BLOOD PRESSURE: 122 MMHG | TEMPERATURE: 98.6 F

## 2021-05-26 DIAGNOSIS — M54.42 CHRONIC MIDLINE LOW BACK PAIN WITH LEFT-SIDED SCIATICA: ICD-10-CM

## 2021-05-26 DIAGNOSIS — R06.02 SOB (SHORTNESS OF BREATH): ICD-10-CM

## 2021-05-26 DIAGNOSIS — G89.29 CHRONIC MIDLINE LOW BACK PAIN WITH LEFT-SIDED SCIATICA: ICD-10-CM

## 2021-05-26 PROCEDURE — 99214 OFFICE O/P EST MOD 30 MIN: CPT | Performed by: FAMILY MEDICINE

## 2021-05-26 PROCEDURE — 99213 OFFICE O/P EST LOW 20 MIN: CPT | Performed by: FAMILY MEDICINE

## 2021-05-26 RX ORDER — METHOCARBAMOL 500 MG/1
500 TABLET, FILM COATED ORAL 4 TIMES DAILY PRN
Qty: 60 TABLET | Refills: 3 | Status: SHIPPED | OUTPATIENT
Start: 2021-05-26 | End: 2021-11-10 | Stop reason: SDUPTHER

## 2021-05-26 RX ORDER — POTASSIUM CHLORIDE 750 MG/1
10 TABLET, FILM COATED, EXTENDED RELEASE ORAL DAILY
Qty: 30 TABLET | Refills: 3 | Status: ON HOLD
Start: 2021-05-26 | End: 2022-01-21

## 2021-05-26 RX ORDER — TIOTROPIUM BROMIDE 18 UG/1
18 CAPSULE ORAL; RESPIRATORY (INHALATION) DAILY
Qty: 30 CAPSULE | Refills: 3 | Status: ON HOLD
Start: 2021-05-26 | End: 2022-01-21

## 2021-05-26 RX ORDER — AMITRIPTYLINE HYDROCHLORIDE 25 MG/1
25 TABLET, FILM COATED ORAL NIGHTLY PRN
Qty: 30 TABLET | Refills: 2 | Status: SHIPPED
Start: 2021-05-26 | End: 2021-07-02 | Stop reason: SINTOL

## 2021-05-26 RX ORDER — FUROSEMIDE 20 MG/1
10 TABLET ORAL 2 TIMES DAILY
Qty: 30 TABLET | Refills: 2 | Status: SHIPPED | OUTPATIENT
Start: 2021-05-26 | End: 2021-05-26

## 2021-05-26 RX ORDER — FUROSEMIDE 20 MG/1
10 TABLET ORAL DAILY
Qty: 30 TABLET | Refills: 3 | Status: SHIPPED | OUTPATIENT
Start: 2021-05-26 | End: 2021-05-26

## 2021-05-26 RX ORDER — FUROSEMIDE 20 MG/1
10 TABLET ORAL 2 TIMES DAILY
Qty: 30 TABLET | Refills: 2 | Status: ON HOLD
Start: 2021-05-26 | End: 2022-01-21

## 2021-05-26 ASSESSMENT — FIBROSIS 4 INDEX: FIB4 SCORE: 0.96

## 2021-05-26 NOTE — ASSESSMENT & PLAN NOTE
"S/p fall 2010 off of semi truck and has had back pain since then  Constant  Exacerbating - walking, \"sitting in the wrong chair\", bending over too much  Alleviating - sitting in the right chair, home exercise program, ibuprofen/tylenol helps a little bit   Prior treatments - PT, was on oxy/fentynl, accupuncture with good relief, chiropractor (no benefit)  Has been on gabapentin, but has side effects. Muscle relaxers with good benefit - flexeril, methocarbamol, soma. Injections - didn't do anything, also with nerve ablation but didn't help   (+) tingling b/l LE, radiates to left leg  No b/b incontinence   Legs have never given out, no falls      "

## 2021-05-27 NOTE — PROGRESS NOTES
"Subjective:     CC:   Chief Complaint   Patient presents with   • Shortness of Breath   • Low Back Pain         HPI:     Chronic low back pain  S/p fall 2010 off of semi truck and has had back pain since then  Constant  Exacerbating - walking, \"sitting in the wrong chair\", bending over too much  Alleviating - sitting in the right chair, home exercise program, ibuprofen/tylenol helps a little bit   Prior treatments - PT, was on oxy/fentynl, accupuncture with good relief, chiropractor (no benefit)  Has been on gabapentin, but has side effects. Muscle relaxers with good benefit - flexeril, methocarbamol, soma. Injections - didn't do anything, also with nerve ablation but didn't help   (+) tingling b/l LE, radiates to left leg  No b/b incontinence   Legs have never given out, no falls        SOB (shortness of breath)  Patient states that she is continuing to have shortness of breath.  She stopped all her medications however the cough returned.  She went to a pulmonologist, was ordered a PET scan to evaluate her nodules, may need resection depending on results.  Patient states that the Lasix is still helping her, only taking 10 to 20 mg daily.  She states that it is very difficult for her to sleep given the shortness of breath, echo previously done was normal.  She also states that the Spiriva and as needed Atrovent have been helping her      Past Medical History:   Diagnosis Date   • Allergy     seasonal, meds   • Anxiety    • Arrhythmia    • Asthma     as a child   • Depression    • Diabetes (HCC)     Prediabetes   • GERD (gastroesophageal reflux disease)    • Head ache     with her period   • Hypertension    • Migraine     stopped when she moved out of [place], possibly allergy related   • Pain of right eye 6/17/2016   • Painful breathing    • Shortness of breath    • Thyroid disease    • Wart 6/17/2016       Social History     Tobacco Use   • Smoking status: Former Smoker     Packs/day: 1.00     Years: 35.00     " "Pack years: 35.00     Start date: 1991   • Smokeless tobacco: Never Used   Vaping Use   • Vaping Use: Never used   Substance Use Topics   • Alcohol use: No     Alcohol/week: 0.0 oz   • Drug use: Never     Frequency: 3.0 times per week     Comment: tincture       Current Outpatient Medications Ordered in Epic   Medication Sig Dispense Refill   • tiotropium (SPIRIVA HANDIHALER) 18 MCG Cap Place 1 capsule into inhaler and inhale every day. 30 capsule 3   • furosemide (LASIX) 20 MG Tab Take 0.5 Tablets by mouth every day. 30 tablet 3   • potassium chloride ER (KLOR-CON) 10 MEQ tablet Take 1 tablet by mouth every day. 30 tablet 3   • amitriptyline (ELAVIL) 25 MG Tab Take 1 tablet by mouth at bedtime as needed for Mild Pain. 30 tablet 2   • methocarbamol (ROBAXIN) 500 MG Tab Take 1 tablet by mouth 4 times a day as needed. 60 tablet 3   • ipratropium (ATROVENT HFA) 17 MCG/ACT Aero Soln Inhale 2 Puffs every 6 hours.     • montelukast (SINGULAIR) 10 MG Tab Take 1 tablet by mouth every evening. 30 tablet 1   • albuterol 108 (90 Base) MCG/ACT Aero Soln inhalation aerosol Inhale 2 Puffs every 6 hours as needed for Shortness of Breath. 8.5 g 3   • Omega-3 Fatty Acids (FISH OIL) 1000 MG Cap capsule Take 1,000 mg by mouth 1 time daily as needed.     • Methylsulfonylmethane (MSM PO) Take  by mouth.       No current Epic-ordered facility-administered medications on file.       Allergies:  Quinine, Fentanyl, and Tramadol      Objective:       Exam:  /82 (BP Location: Left arm, Patient Position: Sitting, BP Cuff Size: Adult)   Pulse 85   Temp 37 °C (98.6 °F) (Temporal)   Resp 16   Ht 1.753 m (5' 9\")   Wt (!) 129 kg (283 lb 14.4 oz)   SpO2 96%   BMI 41.92 kg/m²  Body mass index is 41.92 kg/m².    Gen: No apparent distress.  Neck: Neck is supple   MSK:   Back:   ROM: ROM of the lumbar spine is functional. Flexion: normal. Extension: normal. Side bending: Slightly decreased on the left, normal on the right.  Axial rotation: " normal    Palpation:  Paraspinals: Tender bilaterally at lumbar area, slightly worse on the left  Quadratus lumborum: nontender  Obliques: nontender  Spinous processes: Tender lumbar area  Gluteals: Tender bilaterally  SI Joint: Mildly tender bilaterally    Strength:  L1/2 myotome (hip flexion/hip adduction) : normal  L3 myotome  (knee extension) : normal  L4 myotome  (ankle dorsiflexion): normal  S1 myotome (ankle plantaflexion) : normal    Reflexes:  Bilateral lower extremity reflexes equal and symmetric  Patella (L3): 2/4    Special tests:  SLR: negative  KISHA: Positive on the left  FADIR: Negative bilaterally  Facet loading with extension-rotation: Positive bilaterally, slightly worse on the left    Derm: Warm and dry. No visible rashes  Psy: Alert and oriented, Normal mood and affect. Judgment normal      Labs:   Coccidiomycosis antibodies are negative  Recent BMP is normal    Assessment & Plan:     45 y.o. female with the following -     1. SOB (shortness of breath)  - tiotropium (SPIRIVA HANDIHALER) 18 MCG Cap; Place 1 capsule into inhaler and inhale every day.  Dispense: 30 capsule; Refill: 3  - furosemide (LASIX) 20 MG Tab; Take 0.5 Tablets by mouth twice a day.  Dispense: 30 tablet; Refill: 3  - potassium chloride ER (KLOR-CON) 10 MEQ tablet; Take 1 tablet by mouth every day.  Dispense: 30 tablet; Refill: 3  Patient to add on another dose of Lasix, maybe around 3:00 to 4:00 PM.  Refilling Spiriva.  Providing 10 mEq of potassium instead of taking over-the-counter potassium supplements.    2. Chronic midline low back pain with left-sided sciatica  - REFERRAL TO PHYSICAL THERAPY  - amitriptyline (ELAVIL) 25 MG Tab; Take 1 tablet by mouth at bedtime as needed for Mild Pain.  Dispense: 30 tablet; Refill: 2  - methocarbamol (ROBAXIN) 500 MG Tab; Take 1 tablet by mouth 4 times a day as needed.  Dispense: 60 tablet; Refill: 3  Advised patient to call her insurance to see if they will approve acupuncture, as this  significantly improved her in the past.  We will try formal physical therapy again, she states that she did well with muscle relaxers, so Robaxin is sent over for her.  She did well with gabapentin in the past however she had some side effects.  Can consider Lyrica in the future, can also consider Cymbalta and Effexor for chronic nerve pain.  She is agreeable.  Follow-up in 1 month to see how her medications are helping her.      Return in about 1 month (around 6/26/2021) for f/u meds.    Please note that this dictation was created using voice recognition software. I have made every reasonable attempt to correct obvious errors, but I expect that there are errors of grammar and possibly content that I did not discover before finalizing the note.

## 2021-05-27 NOTE — ASSESSMENT & PLAN NOTE
Patient states that she is continuing to have shortness of breath.  She stopped all her medications however the cough returned.  She went to a pulmonologist, was ordered a PET scan to evaluate her nodules, may need resection depending on results.  Patient states that the Lasix is still helping her, only taking 10 to 20 mg daily.  She states that it is very difficult for her to sleep given the shortness of breath, echo previously done was normal.  She also states that the Spiriva and as needed Atrovent have been helping her

## 2021-06-17 ENCOUNTER — PATIENT MESSAGE (OUTPATIENT)
Dept: SLEEP MEDICINE | Facility: MEDICAL CENTER | Age: 46
End: 2021-06-17

## 2021-06-17 DIAGNOSIS — G89.29 CHRONIC MIDLINE LOW BACK PAIN WITH LEFT-SIDED SCIATICA: ICD-10-CM

## 2021-06-17 DIAGNOSIS — M54.42 CHRONIC MIDLINE LOW BACK PAIN WITH LEFT-SIDED SCIATICA: ICD-10-CM

## 2021-06-17 NOTE — TELEPHONE ENCOUNTER
From: Rina Roger  To: Physician Bonita Al  Sent: 6/17/2021 12:04 PM PDT  Subject: Procedure Question    Hello, for the PET scan the [facility] machine is still in repairs. They have rescheduled my appointment on the June 10th and they rescheduled my appointment on June 18th. Now supposedly I have an appointment for my pet scan on July 7th. I'm just very afraid they're going to cancel that too. Is there anywhere else I can be sent for the scan? Somewhere where the machine isn't broken?

## 2021-07-02 ENCOUNTER — TELEMEDICINE (OUTPATIENT)
Dept: MEDICAL GROUP | Facility: MEDICAL CENTER | Age: 46
End: 2021-07-02
Attending: FAMILY MEDICINE
Payer: MEDICAID

## 2021-07-02 VITALS — BODY MASS INDEX: 41.47 KG/M2 | WEIGHT: 280 LBS | HEIGHT: 69 IN

## 2021-07-02 DIAGNOSIS — R06.02 SOB (SHORTNESS OF BREATH): ICD-10-CM

## 2021-07-02 DIAGNOSIS — G89.29 CHRONIC MIDLINE LOW BACK PAIN WITH LEFT-SIDED SCIATICA: ICD-10-CM

## 2021-07-02 DIAGNOSIS — M54.42 CHRONIC MIDLINE LOW BACK PAIN WITH LEFT-SIDED SCIATICA: ICD-10-CM

## 2021-07-02 PROCEDURE — 99213 OFFICE O/P EST LOW 20 MIN: CPT | Performed by: FAMILY MEDICINE

## 2021-07-02 RX ORDER — PREGABALIN 25 MG/1
25 CAPSULE ORAL 2 TIMES DAILY
Qty: 60 CAPSULE | Refills: 1 | Status: SHIPPED | OUTPATIENT
Start: 2021-07-02 | End: 2021-07-02

## 2021-07-02 RX ORDER — PREGABALIN 25 MG/1
25 CAPSULE ORAL 2 TIMES DAILY
Qty: 60 CAPSULE | Refills: 1 | Status: SHIPPED | OUTPATIENT
Start: 2021-07-02 | End: 2021-08-01

## 2021-07-02 ASSESSMENT — FIBROSIS 4 INDEX: FIB4 SCORE: 0.96

## 2021-07-02 NOTE — ASSESSMENT & PLAN NOTE
Elavil - patient was very drowsy with the medication, even on lower doses  Was also started on robaxin - has been cautious with it but can get some relief with it when she is using it   Has tried gabapentin before but caused heart palpitations, but it was effective  Patient feels like she has done Lyrica before, she thinks that she either had side effects or did not work for her, however she is not too sure.

## 2021-07-02 NOTE — PROGRESS NOTES
Telemedicine: Established Patient   This evaluation was conducted via Zoom using secure and encrypted videoconferencing technology. The patient was in a private location in the state of [place].    The patient's identity was confirmed and verbal consent was obtained for this virtual visit.    Subjective:   CC:   Chief Complaint   Patient presents with   • Follow-Up     recheck medications       Rina Roger is a 45 y.o. female presenting for evaluation and management of:    Chronic low back pain  Elavil - patient was very drowsy with the medication, even on lower doses  Was also started on robaxin - has been cautious with it but can get some relief with it when she is using it   Has tried gabapentin before but caused heart palpitations, but it was effective  Patient feels like she has done Lyrica before, she thinks that she either had side effects or did not work for her, however she is not too sure.    SOB (shortness of breath)  Still awaiting PET CT scan          Allergies   Allergen Reactions   • Quinine    • Fentanyl Shortness of Breath   • Tramadol Vomiting and Nausea       Current medicines (including changes today)  Current Outpatient Medications   Medication Sig Dispense Refill   • pregabalin (LYRICA) 25 MG Cap Take 1 capsule by mouth 2 times a day for 60 days. 60 capsule 1   • tiotropium (SPIRIVA HANDIHALER) 18 MCG Cap Place 1 capsule into inhaler and inhale every day. 30 capsule 3   • potassium chloride ER (KLOR-CON) 10 MEQ tablet Take 1 tablet by mouth every day. 30 tablet 3   • methocarbamol (ROBAXIN) 500 MG Tab Take 1 tablet by mouth 4 times a day as needed. 60 tablet 3   • furosemide (LASIX) 20 MG Tab Take 0.5 Tablets by mouth 2 times a day. 30 tablet 2   • ipratropium (ATROVENT HFA) 17 MCG/ACT Aero Soln Inhale 2 Puffs every 6 hours.     • albuterol 108 (90 Base) MCG/ACT Aero Soln inhalation aerosol Inhale 2 Puffs every 6 hours as needed for Shortness of Breath. 8.5 g 3   • Omega-3 Fatty Acids  (FISH OIL) 1000 MG Cap capsule Take 1,000 mg by mouth 1 time daily as needed.     • Methylsulfonylmethane (MSM PO) Take  by mouth.       No current facility-administered medications for this visit.       Patient Active Problem List    Diagnosis Date Noted   • Pulmonary nodules 04/23/2021   • SOB (shortness of breath) 04/09/2021   • Lower respiratory tract infection 04/07/2021   • Asthma due to seasonal allergies 04/07/2021   • Prediabetes 04/01/2021   • History of posttraumatic stress disorder (PTSD) 04/01/2021   • Major depressive disorder 04/01/2021   • Vitamin D deficiency 04/01/2021   • Perimenopausal symptoms 04/01/2021   • Palpitations 04/13/2020   • Morbid obesity with BMI of 40.0-44.9, adult (HCC) 05/30/2017   • Hypothyroid 05/17/2016   • Family history of diabetes mellitus (DM) 03/31/2016   • Chronic low back pain 03/31/2016   • Upper back pain 03/31/2016   • Insomnia 03/31/2016       Family History   Problem Relation Age of Onset   • Lung Disease Mother         COPD   • Hypertension Mother         Pulmonary hypertension   • Arthritis Mother    • Cancer Mother         melanoma (skin)   • Lupus Mother    • Mult Sclerosis Father    • Hypertension Brother    • Alcohol abuse Brother    • Cancer Maternal Grandmother         Uterine   • Diabetes Maternal Grandfather    • Heart Disease Maternal Grandfather    • Hypertension Maternal Grandfather    • Hyperlipidemia Maternal Grandfather    • Heart Attack Maternal Grandfather    • No Known Problems Paternal Grandfather        She  has a past medical history of Allergy, Anxiety, Arrhythmia, Asthma, Depression, Diabetes ([facility]), GERD (gastroesophageal reflux disease), Head ache, Hypertension, Migraine, Pain of right eye (6/17/2016), Painful breathing, Shortness of breath, Thyroid disease, and Wart (6/17/2016). She also has no past medical history of Anemia, Blood transfusion without reported diagnosis, Cancer ([facility]), Clotting disorder ([facility]), COPD (chronic obstructive  "pulmonary disease) (HCC), Cough, Diabetic neuropathy (HCC), Heart attack (HCC), Heart murmur, Hyperlipidemia, IBD (inflammatory bowel disease), Kidney disease, Pulmonary emphysema (HCC), Seizure (HCC), Sputum production, Stroke (HCC), Substance abuse (HCC), or Wheezing.  She  has a past surgical history that includes carpal tunnel release (Right, 2012) and cholecystectomy (2011).       Objective:   Ht 1.753 m (5' 9\")   Wt (!) 127 kg (280 lb)   BMI 41.35 kg/m²     Physical Exam   Constitutional: Alert, no distress, well-groomed.  Respiratory: Unlabored respiratory effort, no cough.  Psych: Alert and oriented x3, normal affect and mood.      Assessment and Plan:   The following treatment plan was discussed:     1. Chronic midline low back pain with left-sided sciatica  - pregabalin (LYRICA) 25 MG Cap; Take 1 capsule by mouth 2 times a day for 60 days.  Dispense: 60 capsule; Refill: 1  Since patient had good effectiveness with gabapentin in the past, will try Lyrica again.  We will start at a very low dose as patient states that she does tend to have a lot of side effects on her medications.    2. SOB (shortness of breath)  Seen pulmonology, follow-up on her PET CT scan.      Follow-up: Return in about 1 month (around 8/2/2021) for Follow-up medication.         "

## 2021-07-06 ENCOUNTER — APPOINTMENT (OUTPATIENT)
Dept: SLEEP MEDICINE | Facility: MEDICAL CENTER | Age: 46
End: 2021-07-06
Payer: MEDICAID

## 2021-07-07 ENCOUNTER — HOSPITAL ENCOUNTER (OUTPATIENT)
Dept: RADIOLOGY | Facility: MEDICAL CENTER | Age: 46
End: 2021-07-07
Attending: INTERNAL MEDICINE
Payer: MEDICAID

## 2021-07-07 DIAGNOSIS — R91.8 PULMONARY NODULES: ICD-10-CM

## 2021-07-07 PROCEDURE — A9552 F18 FDG: HCPCS

## 2021-07-12 DIAGNOSIS — R06.02 SOB (SHORTNESS OF BREATH): ICD-10-CM

## 2021-07-16 ENCOUNTER — OFFICE VISIT (OUTPATIENT)
Dept: SLEEP MEDICINE | Facility: MEDICAL CENTER | Age: 46
End: 2021-07-16
Payer: MEDICAID

## 2021-07-16 VITALS
DIASTOLIC BLOOD PRESSURE: 72 MMHG | WEIGHT: 285 LBS | OXYGEN SATURATION: 96 % | RESPIRATION RATE: 18 BRPM | HEART RATE: 77 BPM | HEIGHT: 69 IN | SYSTOLIC BLOOD PRESSURE: 102 MMHG | BODY MASS INDEX: 42.21 KG/M2

## 2021-07-16 DIAGNOSIS — R06.02 SOB (SHORTNESS OF BREATH): ICD-10-CM

## 2021-07-16 DIAGNOSIS — E66.01 MORBID OBESITY WITH BMI OF 40.0-44.9, ADULT (HCC): ICD-10-CM

## 2021-07-16 DIAGNOSIS — G47.33 OSA (OBSTRUCTIVE SLEEP APNEA): ICD-10-CM

## 2021-07-16 DIAGNOSIS — R91.8 PULMONARY NODULES: ICD-10-CM

## 2021-07-16 PROCEDURE — 99214 OFFICE O/P EST MOD 30 MIN: CPT | Performed by: INTERNAL MEDICINE

## 2021-07-16 ASSESSMENT — FIBROSIS 4 INDEX: FIB4 SCORE: 0.96

## 2021-07-16 NOTE — PROGRESS NOTES
Chief Complaint   Patient presents with   • Follow-Up     [facility], last seen 5/18/21 by Dr. Al for SOB   • Results     CT done 7/7/21       HPI: This patient is a 45 y.o. female who returns for shortness of breath. Over the past year she has developed intermittent dyspnea.  Triggers include weather changes, wind and being outdoors.  She had been working as a  and commuting between [facility] as well as [place].  She has gone to Urgent Care and has been treated with diuretics, albuterol inhaler as well as steroids.  She felt steroids are highly effective, however symptoms recur after she weans off steroids.  Has been compliant with Spiriva and albuterol inhaler. Denies cough, wheezing or weight loss.  She has associated chest tightness. Symptoms have improved overall.  She is a former smoker of 35 pack years, quit in 2011. Chest CAT scan April 2021 was reviewed and shows a 1.2 cm noncalcified pulmonary nodule in the right middle lobe with multiple satellite nodules.  No gross lymphadenopathy or infiltrates appreciated. Pet scan 07/07/21 was negative.  Minimal left tonsillar tissue noted felt inflammatory.  Cocci antibodies were negative.  She snores and has witnessed apneas.  Admits to nocturnal shortness of breath.    Past Medical History:   Diagnosis Date   • Allergy     seasonal, meds   • Anxiety    • Arrhythmia    • Asthma     as a child   • Depression    • Diabetes (HCC)     Prediabetes   • GERD (gastroesophageal reflux disease)    • Head ache     with her period   • Hypertension    • Migraine     stopped when she moved out of [place], possibly allergy related   • Pain of right eye 6/17/2016   • Painful breathing    • Shortness of breath    • Thyroid disease    • Wart 6/17/2016       Social History     Socioeconomic History   • Marital status:      Spouse name: eva Roger   • Number of children: 1   • Years of education: Not on file   • Highest education level: 11th grade    Occupational History   • Occupation: dispatcher     Employer: OTHER     Comment: self employed   Tobacco Use   • Smoking status: Former Smoker     Packs/day: 1.00     Years: 35.00     Pack years: 35.00     Start date: 1991   • Smokeless tobacco: Never Used   Vaping Use   • Vaping Use: Never used   Substance and Sexual Activity   • Alcohol use: No     Alcohol/week: 0.0 oz   • Drug use: Never     Frequency: 3.0 times per week     Comment: tincture   • Sexual activity: Yes     Partners: Male, Female     Birth control/protection: None   Other Topics Concern   • Not on file   Social History Narrative   • Not on file     Social Determinants of Health     Financial Resource Strain: Medium Risk   • Difficulty of Paying Living Expenses: Somewhat hard   Food Insecurity: Food Insecurity Present   • Worried About Running Out of Food in the Last Year: Sometimes true   • Ran Out of Food in the Last Year: Never true   Transportation Needs: No Transportation Needs   • Lack of Transportation (Medical): No   • Lack of Transportation (Non-Medical): No   Physical Activity: Sufficiently Active   • Days of Exercise per Week: 3 days   • Minutes of Exercise per Session: 50 min   Stress: Stress Concern Present   • Feeling of Stress : Rather much   Social Connections: Moderately Isolated   • Frequency of Communication with Friends and Family: More than three times a week   • Frequency of Social Gatherings with Friends and Family: Never   • Attends Religion Services: Never   • Active Member of Clubs or Organizations: No   • Attends Club or Organization Meetings: Never   • Marital Status:    Intimate Partner Violence:    • Fear of Current or Ex-Partner:    • Emotionally Abused:    • Physically Abused:    • Sexually Abused:        Family History   Problem Relation Age of Onset   • Lung Disease Mother         COPD   • Hypertension Mother         Pulmonary hypertension   • Arthritis Mother    • Cancer Mother         melanoma (skin)   •  Lupus Mother    • Mult Sclerosis Father    • Hypertension Brother    • Alcohol abuse Brother    • Cancer Maternal Grandmother         Uterine   • Diabetes Maternal Grandfather    • Heart Disease Maternal Grandfather    • Hypertension Maternal Grandfather    • Hyperlipidemia Maternal Grandfather    • Heart Attack Maternal Grandfather    • No Known Problems Paternal Grandfather        Current Outpatient Medications on File Prior to Visit   Medication Sig Dispense Refill   • pregabalin (LYRICA) 25 MG Cap Take 1 capsule by mouth 2 times a day for 30 days. 60 capsule 1   • tiotropium (SPIRIVA HANDIHALER) 18 MCG Cap Place 1 capsule into inhaler and inhale every day. 30 capsule 3   • potassium chloride ER (KLOR-CON) 10 MEQ tablet Take 1 tablet by mouth every day. 30 tablet 3   • methocarbamol (ROBAXIN) 500 MG Tab Take 1 tablet by mouth 4 times a day as needed. 60 tablet 3   • furosemide (LASIX) 20 MG Tab Take 0.5 Tablets by mouth 2 times a day. 30 tablet 2   • ipratropium (ATROVENT HFA) 17 MCG/ACT Aero Soln Inhale 2 Puffs every 6 hours.     • albuterol 108 (90 Base) MCG/ACT Aero Soln inhalation aerosol Inhale 2 Puffs every 6 hours as needed for Shortness of Breath. 8.5 g 3   • Methylsulfonylmethane (MSM PO) Take  by mouth.     • Omega-3 Fatty Acids (FISH OIL) 1000 MG Cap capsule Take 1,000 mg by mouth 1 time daily as needed. (Patient not taking: Reported on 7/16/2021)       No current facility-administered medications on file prior to visit.       Allergies: Quinine, Fentanyl, and Tramadol    ROS:   Constitutional: Denies fevers, chills, night sweats, fatigue or weight loss  Eyes: Denies vision loss, pain, drainage, double vision  Ears, Nose, Throat: Denies earache, difficulty hearing, tinnitus, nasal congestion, hoarseness  Cardiovascular: Denies chest pain, tightness, palpitations, orthopnea or edema  Respiratory: As in HPI  Sleep: Denies daytime sleepiness, snoring, apneas, insomnia, morning headaches  GI: Denies  "heartburn, dysphagia, nausea, abdominal pain, diarrhea or constipation  : Denies frequent urination, hematuria, discharge or painful urination  Musculoskeletal: Denies back pain, painful joints, sore muscles  Neurological: Denies weakness or headaches  Skin: No rashes    /72 (BP Location: Right arm, Patient Position: Sitting, BP Cuff Size: Large adult)   Pulse 77   Resp 18   Ht 1.753 m (5' 9\")   Wt (!) 129 kg (285 lb)   SpO2 96%     Physical Exam:  Appearance: Well-nourished, well-developed, in no acute distress  HEENT: Normocephalic, atraumatic, white sclera, PERRLA  Neck: No adenopathy or masses  Respiratory: no intercostal retractions or accessory muscle use  Lungs auscultation: Clear to auscultation bilaterally  Cardiovascular: Regular rate rhythm. No murmurs, rubs or gallops.  No LE edema  Abdomen: soft, nondistended  Gait: Normal  Digits: No clubbing, cyanosis  Motor: No focal deficits  Orientation: Oriented to time, person and place    Diagnosis:  1. Pulmonary nodules     2. SOB (shortness of breath)  PULMONARY FUNCTION TESTS -Test requested: Complete Pulmonary Function Test   3. GILMA (obstructive sleep apnea)  Overnight Home Sleep Study    -presumptive       Plan:  The patient's respiratory symptoms are suspicious for asthma.  Chest CAT scan identifies multiple pulmonary nodules in the right middle lobe with negative PET scan, supporting benign etiology.  Cocci antibodies were negative.  Recommend surveillance with chest CT in 6 months.  Continue Spiriva and albuterol HFA as needed.  Obtain PFT's.  Return for after PFT.      "

## 2021-08-06 ENCOUNTER — TELEMEDICINE (OUTPATIENT)
Dept: MEDICAL GROUP | Facility: MEDICAL CENTER | Age: 46
End: 2021-08-06
Attending: FAMILY MEDICINE
Payer: MEDICAID

## 2021-08-06 VITALS — WEIGHT: 280 LBS | HEIGHT: 69 IN | BODY MASS INDEX: 41.47 KG/M2

## 2021-08-06 DIAGNOSIS — K57.30 DIVERTICULA OF COLON: ICD-10-CM

## 2021-08-06 DIAGNOSIS — G89.29 CHRONIC MIDLINE LOW BACK PAIN WITH LEFT-SIDED SCIATICA: ICD-10-CM

## 2021-08-06 DIAGNOSIS — M54.42 CHRONIC MIDLINE LOW BACK PAIN WITH LEFT-SIDED SCIATICA: ICD-10-CM

## 2021-08-06 DIAGNOSIS — K52.9 COLITIS: ICD-10-CM

## 2021-08-06 PROCEDURE — 99214 OFFICE O/P EST MOD 30 MIN: CPT | Performed by: FAMILY MEDICINE

## 2021-08-06 RX ORDER — METRONIDAZOLE 500 MG/1
500 TABLET ORAL
COMMUNITY
Start: 2021-08-02 | End: 2021-08-12

## 2021-08-06 RX ORDER — LACTOBACILLUS RHAMNOSUS GG 10B CELL
1 CAPSULE ORAL DAILY
Status: ON HOLD | COMMUNITY
Start: 2021-08-02 | End: 2022-01-21

## 2021-08-06 RX ORDER — CEFDINIR 300 MG/1
300 CAPSULE ORAL
COMMUNITY
Start: 2021-08-02 | End: 2021-08-12

## 2021-08-06 ASSESSMENT — FIBROSIS 4 INDEX: FIB4 SCORE: 0.96

## 2021-08-07 NOTE — ASSESSMENT & PLAN NOTE
Dx with colitis in ER after diarrhea and abd cramping  Currently on cefdinir and flagyl and is helping. Feeling very fatigued and tired. Was told to get referral for c-scope

## 2021-08-07 NOTE — PROGRESS NOTES
Telemedicine: Established Patient   This evaluation was conducted via Zoom using secure and encrypted videoconferencing technology. The patient was in a private location in the state of [place].    The patient's identity was confirmed and verbal consent was obtained for this virtual visit.    Subjective:   CC:   Chief Complaint   Patient presents with   • Results     from ER       Rina Simon Roger is a 45 y.o. female presenting for evaluation and management of:    Chronic low back pain  Pt has been on lyrica 25mg bid - hasn't really helped her. She has been tired recently due to colitis infection. She would like to keep the same dose for now as she is nervous about increasing doses    Colitis  Dx with colitis in ER after diarrhea and abd cramping  Currently on cefdinir and flagyl and is helping. Feeling very fatigued and tired. Was told to get referral for c-scope            Allergies   Allergen Reactions   • Quinine    • Fentanyl Shortness of Breath   • Tramadol Vomiting and Nausea       Current medicines (including changes today)  Current Outpatient Medications   Medication Sig Dispense Refill   • metroNIDAZOLE (FLAGYL) 500 MG Tab Take 500 mg by mouth.     • Lactobacillus-Inulin ([facility] DIGESTIVE [facility]) Cap Take 1 tablet by mouth every day.     • cefdinir (OMNICEF) 300 MG Cap Take 300 mg by mouth.     • tiotropium (SPIRIVA HANDIHALER) 18 MCG Cap Place 1 capsule into inhaler and inhale every day. 30 capsule 3   • potassium chloride ER (KLOR-CON) 10 MEQ tablet Take 1 tablet by mouth every day. 30 tablet 3   • methocarbamol (ROBAXIN) 500 MG Tab Take 1 tablet by mouth 4 times a day as needed. 60 tablet 3   • furosemide (LASIX) 20 MG Tab Take 0.5 Tablets by mouth 2 times a day. 30 tablet 2   • ipratropium (ATROVENT HFA) 17 MCG/ACT Aero Soln Inhale 2 Puffs every 6 hours.     • albuterol 108 (90 Base) MCG/ACT Aero Soln inhalation aerosol Inhale 2 Puffs every 6 hours as needed for Shortness of Breath. 8.5 g 3   •  Omega-3 Fatty Acids (FISH OIL) 1000 MG Cap capsule Take 1,000 mg by mouth 1 time daily as needed. (Patient not taking: Reported on 7/16/2021)     • Methylsulfonylmethane (MSM PO) Take  by mouth.       No current facility-administered medications for this visit.       Patient Active Problem List    Diagnosis Date Noted   • Colitis 08/06/2021   • Pulmonary nodules 04/23/2021   • SOB (shortness of breath) 04/09/2021   • Lower respiratory tract infection 04/07/2021   • Asthma due to seasonal allergies 04/07/2021   • Prediabetes 04/01/2021   • History of posttraumatic stress disorder (PTSD) 04/01/2021   • Major depressive disorder 04/01/2021   • Vitamin D deficiency 04/01/2021   • Perimenopausal symptoms 04/01/2021   • Palpitations 04/13/2020   • Morbid obesity with BMI of 40.0-44.9, adult (HCC) 05/30/2017   • Hypothyroid 05/17/2016   • Family history of diabetes mellitus (DM) 03/31/2016   • Chronic low back pain 03/31/2016   • Upper back pain 03/31/2016   • Insomnia 03/31/2016       Family History   Problem Relation Age of Onset   • Lung Disease Mother         COPD   • Hypertension Mother         Pulmonary hypertension   • Arthritis Mother    • Cancer Mother         melanoma (skin)   • Lupus Mother    • Mult Sclerosis Father    • Hypertension Brother    • Alcohol abuse Brother    • Cancer Maternal Grandmother         breast ca   • Diabetes Maternal Grandfather    • Heart Disease Maternal Grandfather    • Hypertension Maternal Grandfather    • Hyperlipidemia Maternal Grandfather    • Heart Attack Maternal Grandfather    • No Known Problems Paternal Grandfather    • Cancer Maternal Aunt         breast       She  has a past medical history of Allergy, Anxiety, Arrhythmia, Asthma, Colitis (8/6/2021), Depression, Diabetes (HCC), GERD (gastroesophageal reflux disease), Head ache, Hypertension, Migraine, Pain of right eye (6/17/2016), Painful breathing, Shortness of breath, Thyroid disease, and Wart (6/17/2016). She also has  "no past medical history of Anemia, Blood transfusion without reported diagnosis, Cancer (HCC), Clotting disorder (HCC), COPD (chronic obstructive pulmonary disease) (HCC), Cough, Diabetic neuropathy (HCC), Heart attack (HCC), Heart murmur, Hyperlipidemia, Kidney disease, Pulmonary emphysema (HCC), Seizure (HCC), Sputum production, Stroke (HCC), Substance abuse (HCC), or Wheezing.  She  has a past surgical history that includes carpal tunnel release (Right, 2012) and cholecystectomy (2011).       Objective:   Ht 1.753 m (5' 9\")   Wt (!) 127 kg (280 lb)   BMI 41.35 kg/m²     Physical Exam   Constitutional: Alert, no distress, well-groomed.  Respiratory: Unlabored respiratory effort, no cough.  MSK: moves all extremities.  Psych: Alert and oriented x3, normal affect and mood.    Labs - reviewed CT done at ER showing diffuse diverticulosis and colitis    Assessment and Plan:   The following treatment plan was discussed:     1. Colitis  - REFERRAL TO GASTROENTEROLOGY    2. Diverticula of colon  - REFERRAL TO GASTROENTEROLOGY    3. Chronic midline low back pain with left-sided sciatica  Pt would like to continue lyrica at current dose and increase later as she doesn't want to change her med regimen while she is recovering from colitis.       Follow-up: Return in about 1 month (around 9/6/2021) for f/u back pain.         "

## 2021-08-07 NOTE — ASSESSMENT & PLAN NOTE
Pt has been on lyrica 25mg bid - hasn't really helped her. She has been tired recently due to colitis infection. She would like to keep the same dose for now as she is nervous about increasing doses

## 2021-08-12 ENCOUNTER — NON-PROVIDER VISIT (OUTPATIENT)
Dept: SLEEP MEDICINE | Facility: MEDICAL CENTER | Age: 46
End: 2021-08-12
Attending: INTERNAL MEDICINE
Payer: MEDICAID

## 2021-08-12 VITALS — HEIGHT: 68 IN | BODY MASS INDEX: 43.65 KG/M2 | WEIGHT: 288 LBS

## 2021-08-12 DIAGNOSIS — R06.02 SOB (SHORTNESS OF BREATH): ICD-10-CM

## 2021-08-12 DIAGNOSIS — G47.33 OSA (OBSTRUCTIVE SLEEP APNEA): ICD-10-CM

## 2021-08-12 PROCEDURE — 94729 DIFFUSING CAPACITY: CPT | Performed by: INTERNAL MEDICINE

## 2021-08-12 PROCEDURE — 94726 PLETHYSMOGRAPHY LUNG VOLUMES: CPT | Performed by: INTERNAL MEDICINE

## 2021-08-12 PROCEDURE — 94010 BREATHING CAPACITY TEST: CPT | Performed by: INTERNAL MEDICINE

## 2021-08-12 ASSESSMENT — PULMONARY FUNCTION TESTS
FEV1/FVC_PERCENT_PREDICTED: 80
FEV1/FVC_PERCENT_PREDICTED: 105
FEV1_PERCENT_PREDICTED: 82
FEV1_LLN: 2.75
FEV1/FVC: 84
FEV1/FVC_PERCENT_LLN: 67
FEV1: 2.7
FVC_PERCENT_PREDICTED: 78
FVC_PREDICTED: 4.1
FEV1/FVC_PERCENT_PREDICTED: 104
FEV1/FVC_PREDICTED: 81
FEV1/FVC: 84
FEV1_PREDICTED: 3.29
FVC_LLN: 3.43
FVC: 3.2

## 2021-08-12 ASSESSMENT — FIBROSIS 4 INDEX: FIB4 SCORE: 0.96

## 2021-08-12 NOTE — PROCEDURES
Technician: FLOR Burnham    Technician Comment:  Good patient effort & cooperation.  The results of this test meet the ATS/ERS standards for acceptability & reproducibility.  Test was performed on the [employer] Body Plethysmograph-Elite DX system.  Predicted values were GLI-2012 for spirometry, GLI-2017 for DLCO, ITS for Lung Volumes.  The DLCO was uncorrected for Hgb.     1. Baseline spirometry demonstrates a low normal  FEV1. FEV1/FVC ratio is 84%.  FEV1 is 2.70 L which is 82% predicted.    2.  An inhaled bronchodilator was not administered.    3. Lung volumes demonstrate a normal total lung capacity at 90% of predicted.    4. Gas exchange as estimated by DLCO is normal at 101% of predicted.    5. Airway resistance is in the normal range.      Impression:    This study demonstrates the presence of a mild restrictive process.  This is consistent with the patient's BMI of 43.8.

## 2021-08-17 PROCEDURE — 95806 SLEEP STUDY UNATT&RESP EFFT: CPT | Performed by: FAMILY MEDICINE

## 2021-08-18 NOTE — PROCEDURES
DIAGNOSTIC HOME SLEEP TEST (HST) REPORT       PATIENT ID:  NAME:  Rina Roger  MRN:               [id number]  YOB: 1975  DATE OF STUDY: 8/12/2021      Impression:     This study shows evidence of:     1.Severe obstructive sleep apnea with  Respiratory Event Index (SALINA) of 57.2 per hour . These findings are based on the recording time (flow evaluation time).     2.  O2 Sat. adebayo was 69%, avg O2 was 91 % and baseline O2 at 99 %. O2 sat was below 89% for 61 min of the flow evaluation time. Avg HR 71 BPM.      TECHNICAL DESCRIPTION:  ResMed Device used was a type-III home study device. Home sleep study recording included: Airflow recording by nasal pressure transducer; Respiratory Effort by abdominal Respiratory Bands; O2 by finger oximetry. A position sensor and a snore channel was also used.    Scoring Criteria: A modification of the the AASM Manual for the Scoring of Sleep and Associated Events. Obstructive apnea was scored by cessation of airflow for at least 10 seconds with continuing respiratory effort.  Central apnea was scored by cessation of airflow for at least 10 seconds with no effort.  Hypopnea was scored by a 30% or more reduction in airflow for at least 10 seconds accompanied by an arterial oxygen desaturation of 3% or more.  (For Medicare patients, hypopneas were scored by a 30% or more reduction in airflow for at least 10 seconds accompanied by an arterial oxygen saturation of 4% or more, as required by their insurance, CMS.        General sleep summary: . Total recording time is 423 minutes and total flow evaluation time is 422 minutes. The patient spent 0 minutes in the supine position.    Respiratory events:    Apneas: 103 (Obstructive apnea index 14.2/hr, Central apnea index 0.3 /hr, mixed 0.1 /hour)  Hypopneas: 300    Recommendations:    1. CPAP titration study vs Auto CPAP trial .   2.   In general patients with sleep apnea are advised to avoid alcohol and sedatives  and to not operate a motor vehicle while drowsy. In some cases alternative treatment options may prove effective in resolving sleep apnea in these options include upper airway surgery, the use of a dental orthotic or weight loss and positional therapy. Clinical correlation is required.         Michele Temple MD

## 2021-08-19 ENCOUNTER — SLEEP CENTER VISIT (OUTPATIENT)
Dept: SLEEP MEDICINE | Facility: MEDICAL CENTER | Age: 46
End: 2021-08-19
Payer: MEDICAID

## 2021-08-19 VITALS
HEART RATE: 81 BPM | RESPIRATION RATE: 16 BRPM | OXYGEN SATURATION: 96 % | WEIGHT: 287.2 LBS | DIASTOLIC BLOOD PRESSURE: 78 MMHG | SYSTOLIC BLOOD PRESSURE: 130 MMHG | BODY MASS INDEX: 42.54 KG/M2 | HEIGHT: 69 IN

## 2021-08-19 DIAGNOSIS — R06.02 SOB (SHORTNESS OF BREATH): ICD-10-CM

## 2021-08-19 DIAGNOSIS — G47.33 OSA (OBSTRUCTIVE SLEEP APNEA): ICD-10-CM

## 2021-08-19 PROCEDURE — 99214 OFFICE O/P EST MOD 30 MIN: CPT | Performed by: NURSE PRACTITIONER

## 2021-08-19 ASSESSMENT — FIBROSIS 4 INDEX: FIB4 SCORE: 0.96

## 2021-08-19 NOTE — PROGRESS NOTES
"Chief Complaint   Patient presents with   • Follow-Up     PFT results       HPI:  Rina Roger is a 45 y.o. year old female here today for follow-up on shortness of breath.  Last seen 7/16/2021 by Dr Al.  Per last note, \"Over the past year she has developed intermittent dyspnea.  Triggers include weather changes, wind and being outdoors.  She had been working as a  and commuting between [facility] as well as [place].  [facility] and has been treated with diuretics, albuterol inhaler as well as steroids.  She felt steroids are highly effective, however symptoms recur after she weans off steroids.  Has been compliant with Spiriva and albuterol inhaler. Denies cough, wheezing or weight loss.  She has associated chest tightness. Symptoms have improved overall.  She is a former smoker of 35 pack years, quit in 2011. Chest CAT scan April 2021 was reviewed and shows a 1.2 cm noncalcified pulmonary nodule in the right middle lobe with multiple satellite nodules.  No gross lymphadenopathy or infiltrates appreciated. Pet scan 07/07/21 was negative.  Minimal left tonsillar tissue noted felt inflammatory.  Cocci antibodies were negative. She snores and has witnessed apneas.  Admits to nocturnal shortness of breath.\"    At last visit a home sleep study and PFTs were orderedShe still states she is experiencing symptoms where she experiences shortness of breath and dyspnea for no apparent reason. For this she has tried Spiriva and albuterol to which she has no perceived benefit. She does still state that steroids have improved the symptoms but, once they are out of her system, the symptoms resume. 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 She was also worked in the petroleum business.    Home sleep study (8/12/2021):  Severe obstructive sleep apnea with Respiratory Event Index (SALINA) of 57.2 per hour. O2 Sat. adebayo " was 69%, avg O2 was 91 % and baseline O2 at 99 %. O2 sat was below 89% for 61 min of the flow evaluation time. Avg HR 71 BPM.    PFTs (8/12/2021):  Low normal  FEV1. FEV1/FVC ratio is 84%.  FEV1 is 2.70 L which is 82% predicted.  An inhaled bronchodilator was not administered. Lung volumes demonstrate a normal total lung capacity at 90% of predicted.  Gas exchange as estimated by DLCO is normal at 101% of predicted. Airway resistance is in the normal range.  Impression:  This study demonstrates the presence of a mild restrictive process.  This is consistent with the patient's BMI of 43.8.    ROS: As per HPI and otherwise negative if not stated.    Past Medical History:   Diagnosis Date   • Allergy     seasonal, meds   • Anxiety    • Arrhythmia    • Asthma     as a child   • Colitis 8/6/2021   • Depression    • Diabetes (HCC)     Prediabetes   • GERD (gastroesophageal reflux disease)    • Head ache     with her period   • Hypertension    • Migraine     stopped when she moved out of [place], possibly allergy related   • Pain of right eye 6/17/2016   • Painful breathing    • Shortness of breath    • Thyroid disease    • Wart 6/17/2016       Past Surgical History:   Procedure Laterality Date   • CARPAL TUNNEL RELEASE Right 2012   • CHOLECYSTECTOMY  2011       Family History   Problem Relation Age of Onset   • Lung Disease Mother         COPD   • Hypertension Mother         Pulmonary hypertension   • Arthritis Mother    • Cancer Mother         melanoma (skin)   • Lupus Mother    • Mult Sclerosis Father    • Hypertension Brother    • Alcohol abuse Brother    • Cancer Maternal Grandmother         breast ca   • Diabetes Maternal Grandfather    • Heart Disease Maternal Grandfather    • Hypertension Maternal Grandfather    • Hyperlipidemia Maternal Grandfather    • Heart Attack Maternal Grandfather    • No Known Problems Paternal Grandfather    • Cancer Maternal Aunt         breast       Social History     Socioeconomic  History   • Marital status:      Spouse name: eva Roger   • Number of children: 1   • Years of education: Not on file   • Highest education level: 11th grade   Occupational History   • Occupation: dispatcher     Employer: OTHER     Comment: self employed   Tobacco Use   • Smoking status: Former Smoker     Packs/day: 1.00     Years: 35.00     Pack years: 35.00     Start date: 1991   • Smokeless tobacco: Never Used   Vaping Use   • Vaping Use: Never used   Substance and Sexual Activity   • Alcohol use: No     Alcohol/week: 0.0 oz   • Drug use: Never     Frequency: 3.0 times per week     Comment: tincture   • Sexual activity: Yes     Partners: Male, Female     Birth control/protection: None   Other Topics Concern   • Not on file   Social History Narrative   • Not on file     Social Determinants of Health     Financial Resource Strain: Medium Risk   • Difficulty of Paying Living Expenses: Somewhat hard   Food Insecurity: Food Insecurity Present   • Worried About Running Out of Food in the Last Year: Sometimes true   • Ran Out of Food in the Last Year: Never true   Transportation Needs: No Transportation Needs   • Lack of Transportation (Medical): No   • Lack of Transportation (Non-Medical): No   Physical Activity: Sufficiently Active   • Days of Exercise per Week: 3 days   • Minutes of Exercise per Session: 50 min   Stress: Stress Concern Present   • Feeling of Stress : Rather much   Social Connections: Moderately Isolated   • Frequency of Communication with Friends and Family: More than three times a week   • Frequency of Social Gatherings with Friends and Family: Never   • Attends [employer] Services: Never   • Active Member of Clubs or Organizations: No   • Attends Club or Organization Meetings: Never   • Marital Status:    Intimate Partner Violence:    • Fear of Current or Ex-Partner:    • Emotionally Abused:    • Physically Abused:    • Sexually Abused:        Allergies as of 08/19/2021 - Reviewed  "08/19/2021   Allergen Reaction Noted   • Quinine  04/23/2021   • Fentanyl Shortness of Breath 03/31/2016   • Tramadol Vomiting and Nausea 03/31/2016        Vitals:  /78 (BP Location: Left arm, Patient Position: Sitting, BP Cuff Size: Large adult)   Pulse 81   Resp 16   Ht 1.753 m (5' 9\")   Wt (!) 130 kg (287 lb 3.2 oz)   SpO2 96%     Current medications as of today   Current Outpatient Medications   Medication Sig Dispense Refill   • Fluticasone Furoate-Vilanterol (BREO ELLIPTA) 100-25 MCG/INH AEROSOL POWDER, BREATH ACTIVATED Inhale 1 Puff every day. Rinse mouth after use. 1 Each 3   • Lactobacillus-Inulin ([facility] DIGESTIVE HEALTH) Cap Take 1 tablet by mouth every day.     • tiotropium (SPIRIVA HANDIHALER) 18 MCG Cap Place 1 capsule into inhaler and inhale every day. 30 capsule 3   • potassium chloride ER (KLOR-CON) 10 MEQ tablet Take 1 tablet by mouth every day. 30 tablet 3   • methocarbamol (ROBAXIN) 500 MG Tab Take 1 tablet by mouth 4 times a day as needed. 60 tablet 3   • furosemide (LASIX) 20 MG Tab Take 0.5 Tablets by mouth 2 times a day. 30 tablet 2   • ipratropium (ATROVENT HFA) 17 MCG/ACT Aero Soln Inhale 2 Puffs every 6 hours.     • albuterol 108 (90 Base) MCG/ACT Aero Soln inhalation aerosol Inhale 2 Puffs every 6 hours as needed for Shortness of Breath. 8.5 g 3   • Omega-3 Fatty Acids (FISH OIL) 1000 MG Cap capsule Take 1,000 mg by mouth 1 time a day as needed.     • Methylsulfonylmethane (MSM PO) Take  by mouth.       No current facility-administered medications for this visit.         Physical Exam:   Gen:           Alert and oriented, No apparent distress. Mood and affect appropriate, normal interaction with examiner.  Eyes:          PERRL, EOM intact, sclere white, conjunctive moist.  Ears:          Not examined. No lesions or deformities.  Hearing:     Grossly intact.  Nose:          Normal, no lesions or deformities.  Dentition:    Good dentition.  Oropharynx:   Tongue normal, " posterior pharynx without erythema or exudate.  Neck:        Supple, trachea midline, no masses.  Respiratory Effort: No intercostal retractions or use of accessory muscles.   Lung Auscultation:      Clear to auscultation bilaterally; no rales, rhonchi or wheezing.  CV:            Regular rate and rhythm. No murmurs, rubs or gallops.  Abd:           Not examined. Soft non tender, non distended. Normal active bowel sounds. No masses.  Lymphadenopathy: No palpable nodes or edema.  Gait and Station: Normal.  Digits and Nails: No clubbing, cyanosis, petechiae, or nodes.   Cranial Nerves: II-XII grossly intact.  Skin:        No rashes, lesions or ulcers noted.               Ext:           No cyanosis or edema.      Assessment:  1. GILMA (obstructive sleep apnea)  Polysomnography Titration   2. SOB (shortness of breath)         Immunizations:    Flu: 12/30/2020  COVID-19: 5/15/2021, 6/19/2021    Plan:  1. Recent home sleep study showed severe GILMA with an SALINA of 57.2 per hour. O2 Sat. adebayo was 69%, she does endorse snoring and gasping for air throughout the nighttime. Symptoms cause her frequent awakenings and she does endorse excessive daytime sleepiness. At this time I am ordering a in lab titration study. I will review results of the study and order machine. She will follow up in approximately 4 months for first compliance check.  2. I am giving a sample of Breo to be used in addition with Spiriva. It is possible that the patient suffers from reactive airway disease. There is no evidence of obstruction on PFTs. If patient finds benefit in Breo, we will refill this for her. We will reassess in approximately 4 months or sooner if needed. I am also hopeful that the shortness of breath improves upon use of CPAP machine. I do not feel like this is a restrictive or interstitial lung disease as previous imaging did not indicate such, and there is no evidence of crackles or adventitious breath sounds upon exam.  3. Reach out via  MyChart with any questions or concerns before next appointment.    Please note that this dictation was created using voice recognition software. I have made every reasonable attempt to correct obvious errors, but it is possible there are errors of grammar and possibly content that I did not discover before finalizing the note.

## 2021-08-19 NOTE — PATIENT INSTRUCTIONS
1.  Reviewed results of sleep study and PFTs.  PFTs show a mild restriction but no obstruction.  Home sleep study does indicate severe obstructive sleep apnea.  I am ordering a in lab titration to be done.  I will review results and order PAP therapy as appropriate.  We will follow up in approximately 4 months for first compliance check.  I am also giving a sample of Breo in addition to Spiriva.  If you do find benefit with this medication please message through my chart and I will refill to your preferred pharmacy.

## 2021-08-20 ENCOUNTER — SLEEP STUDY (OUTPATIENT)
Dept: SLEEP MEDICINE | Facility: MEDICAL CENTER | Age: 46
End: 2021-08-20
Attending: NURSE PRACTITIONER
Payer: MEDICAID

## 2021-08-20 DIAGNOSIS — G47.33 OSA (OBSTRUCTIVE SLEEP APNEA): ICD-10-CM

## 2021-08-26 NOTE — PROCEDURES
Comments:  The patient underwent a CPAP titration using the standard montage for measurement of parameters of sleep, respiratory events, movement abnormalities, and heart rate and rhythm.   A microphone was used to monitor snoring.  Interpretation:  Study start time was 10:02:13 PM. Diagnostic recording time was 7h 57.5m with a total sleep time of 7h 10.5m resulting in a sleep efficiency of 90.16%%.   Sleep latency from the start of the study was 01 minutes and the latency from sleep to REM was 64 minutes.  In total,45 arousals were scored for an arousal index of 6.3.  Respiratory:  There were a total of 3 apneas consisting of 0 obstructive apneas, 0 mixed apneas, and 3 central apneas. A total of 37 hypopneas were scored.  The apnea index was 0.42 per hour and the hypopnea index was 5.16 per hour resulting in an overall AHI of 5.57.  AHI during rem was 4.0 and AHI while supine was 8.57.  Oximetry:  There was a mean oxygen saturation of 93.0% with a minimum oxygen saturation of 77.0%. Time spent during sleep with oxygen saturations below 89% was 13.4 minutes.  Cardiac:  The highest heart rate seen while awake was 98 BPM while the highest heart rate during sleep was 90 BPM with an average sleeping heart rate of 79 BPM.  Limb Movements:  There were a total of 4 PLMs during sleep, of which 0 were PLMS arousals. This resulted in a PLMS index of 0.6 and a PLMS arousal index of 0.0.  CPAP was tried from 5 to 13cm H2O.    RECORDING TECHNIQUE:       After the scalp was prepared, gold plated electrodes were applied to the scalp according to the International 10-20 System. EEG (electroencephalogram) was continuously monitored from the O1-M2, O2-M1, C3-M2, C4-M1, F3-M2, and F4-M1.   EOGs (electrooculograms) were monitored by electrodes placed at the left and right outer canthi.  Chin EMG (electromyogram) was monitored by electrodes placed on the mentalis and sub-mentalis muscles.  Nasal and oral airflow were monitored using a  triple port thermocouple as well as oronasal pressure transducer.  Respiratory effort was measured by inductive plethysmography technology employing abdominal and thoracic belts.  Blood oxygen saturation and pulse were monitored by pulse oximetry.  Heart rhythm was monitored by surface electrocardiogram.  Leg EMG was studied using surface electrodes placed on left and right anterior tibialis.  A microphone was used to monitor tracheal sounds and snoring.  Body position was monitored and documented by technician observation      SUMMARY:    This was an overnight positive airway pressure titration polysomnogram.  The patient chose to use a small DreamWear mask and heated humidification.     The total recording time was 477 minutes, the sleep period time was 476 minutes, and the total sleep time was 430 minutes.  The patient's sleep efficiency was 90.16% which is normal.  The patient experienced a normal 6 REM period(s).    The sleep stage durations revealed 45.5 minutes of wake after sleep onset (WASO), 23.5 minutes of N1 sleep, 290.0 minutes of N2 sleep, 13.0 minutes of N3 sleep, and 104.0 minutes of REM.    The latency to sleep was 1 minutes which is reduced.  The latency to REM was 1 hour 4 minutes which is normal.  Minimal sleep fragmentation occurred.  The arousal index was 6.3.  The Limb Movement with Arousal Index was 0, the Total Limb Movement (isolated) Index was 1.4, and the PLM Series Index was 0.6.    The patient experienced 3 central and 0 obstructive apneas, 1 central and 36 obstructive hypopneas, 40 apneas and hypopneas, and 1 RERAS.  The apnea hypopnea index was 5.6, the RDI was 5.7, the mean event duration was 19.0 seconds, and the longest event lasted 61.3 seconds.  The REM index was 1.0 and the supine index was 8.6.  The apnea hypopnea index represents mild sleep apnea hypopnea syndrome during the PAP titration.    The adebayo saturation during sleep was 77% and the patient spent 8.9% of the recording  with saturations less than or equal to 90%.    During sleep, the minimum heart rate was 66 bpm, the mean heart rate was 79 bpm, and the maximum heart rate was 90 bpm.    The technician initiated treatment with CPAP 5 cmH2O and increased the pressure to a maximum of CPAP 13 cm water.    The patient did best on CPAP 13 centimeters water with a resultant AHI of 1.6, a minimum saturation of 88%, a maximum saturation of 100%, and the achievement of supine REM sleep.        ASSESSMENT:    Successful CPAP titration to a best pressure of 13 cm water with a resultant AHI 1.6, a adebayo saturation of 88%, a maximum saturation of 100%, and the achievement of supine REM sleep.        RECOMMENDATION:    Recommend CPAP 13 cmH2O using small DreamWear mask and heated humidification followed by data card and clinical review in 6-8 weeks.        Dr. Chuck Altman MD

## 2021-08-27 ENCOUNTER — PATIENT MESSAGE (OUTPATIENT)
Dept: SLEEP MEDICINE | Facility: MEDICAL CENTER | Age: 46
End: 2021-08-27

## 2021-08-27 DIAGNOSIS — G47.33 OSA (OBSTRUCTIVE SLEEP APNEA): ICD-10-CM

## 2021-08-30 NOTE — PATIENT COMMUNICATION
The patient does not have an appointment until the end of September.  Do you want to order him a machinesooner?  Please advise, thank you.

## 2021-08-31 PROCEDURE — 95811 POLYSOM 6/>YRS CPAP 4/> PARM: CPT | Performed by: INTERNAL MEDICINE

## 2021-08-31 NOTE — PROGRESS NOTES
Sleep titration study results reviewed and placed order for CPAP therapy.  Patient will have 3-month compliance follow-up in December.  This visit is already scheduled.  Message patient via [email or web address] with these results.

## 2021-09-03 ENCOUNTER — TELEMEDICINE (OUTPATIENT)
Dept: MEDICAL GROUP | Facility: MEDICAL CENTER | Age: 46
End: 2021-09-03
Attending: FAMILY MEDICINE
Payer: MEDICAID

## 2021-09-03 VITALS — WEIGHT: 288 LBS | HEIGHT: 68 IN | BODY MASS INDEX: 43.65 KG/M2

## 2021-09-03 DIAGNOSIS — R19.7 DIARRHEA, UNSPECIFIED TYPE: ICD-10-CM

## 2021-09-03 DIAGNOSIS — M54.42 CHRONIC MIDLINE LOW BACK PAIN WITH LEFT-SIDED SCIATICA: ICD-10-CM

## 2021-09-03 DIAGNOSIS — G89.29 CHRONIC MIDLINE LOW BACK PAIN WITH LEFT-SIDED SCIATICA: ICD-10-CM

## 2021-09-03 DIAGNOSIS — K52.9 COLITIS: ICD-10-CM

## 2021-09-03 PROCEDURE — 99214 OFFICE O/P EST MOD 30 MIN: CPT | Performed by: FAMILY MEDICINE

## 2021-09-03 RX ORDER — PREGABALIN 50 MG/1
50 CAPSULE ORAL
Qty: 30 CAPSULE | Refills: 2 | Status: SHIPPED
Start: 2021-09-03 | End: 2021-09-28

## 2021-09-03 RX ORDER — METRONIDAZOLE 500 MG/1
500 TABLET ORAL 2 TIMES DAILY
Qty: 20 TABLET | Refills: 0 | Status: SHIPPED | OUTPATIENT
Start: 2021-09-03 | End: 2021-09-13

## 2021-09-03 RX ORDER — CEFDINIR 300 MG/1
300 CAPSULE ORAL 2 TIMES DAILY
Qty: 20 CAPSULE | Refills: 0 | Status: SHIPPED | OUTPATIENT
Start: 2021-09-03 | End: 2021-09-13

## 2021-09-03 ASSESSMENT — FIBROSIS 4 INDEX: FIB4 SCORE: 0.96

## 2021-09-03 NOTE — ASSESSMENT & PLAN NOTE
"Pt is still having watery stools and abd cramping immediately after eating with urgency. Certain foods make it worse.   Pt took 10 days of cefdinir and flagyl and was better with \"soft serve\" consistency. After she finished abx it got watery again, now increased fiber intake and she is back to \"soft serve\" consistency. Also with multiple food items that her \"stomach doesn't agree with\", and she will have watery diarrhea.     "

## 2021-09-04 NOTE — PROGRESS NOTES
"Telemedicine: Established Patient   This evaluation was conducted via Zoom using secure and encrypted videoconferencing technology. The patient was in a private location in the state of [place].    The patient's identity was confirmed and verbal consent was obtained for this virtual visit.    Subjective:   CC:   Chief Complaint   Patient presents with   • Follow-Up       Rina Roger is a 45 y.o. female presenting for evaluation and management of:    Colitis  Pt is still having watery stools and abd cramping immediately after eating with urgency. Certain foods make it worse.   Pt took 10 days of cefdinir and flagyl and was better with \"soft serve\" consistency. After she finished abx it got watery again, now increased fiber intake and she is back to \"soft serve\" consistency. Also with multiple food items that her \"stomach doesn't agree with\", and she will have watery diarrhea.       Chronic low back pain  Pt started to get burning sensation in her legs during therapy, so PT had to back off of the therapy. She has had about 6 sessions now. She has not felt improvement in her low back.       Allergies   Allergen Reactions   • Quinine    • Fentanyl Shortness of Breath   • Tramadol Vomiting and Nausea       Current medicines (including changes today)  Current Outpatient Medications   Medication Sig Dispense Refill   • pregabalin (LYRICA) 50 MG capsule Take 1 Capsule by mouth at bedtime for 90 days. 30 Capsule 2   • cefdinir (OMNICEF) 300 MG Cap Take 1 Capsule by mouth 2 times a day for 10 days. 20 Capsule 0   • metroNIDAZOLE (FLAGYL) 500 MG Tab Take 1 Tablet by mouth 2 times a day for 10 days. 20 Tablet 0   • Fluticasone Furoate-Vilanterol (BREO ELLIPTA) 100-25 MCG/INH AEROSOL POWDER, BREATH ACTIVATED Inhale 1 Puff every day. Rinse mouth after use. 1 Each 3   • Lactobacillus-Inulin ([facility] DIGESTIVE HEALTH) Cap Take 1 tablet by mouth every day.     • tiotropium (SPIRIVA HANDIHALER) 18 MCG Cap Place 1 capsule " into inhaler and inhale every day. 30 capsule 3   • potassium chloride ER (KLOR-CON) 10 MEQ tablet Take 1 tablet by mouth every day. 30 tablet 3   • methocarbamol (ROBAXIN) 500 MG Tab Take 1 tablet by mouth 4 times a day as needed. 60 tablet 3   • furosemide (LASIX) 20 MG Tab Take 0.5 Tablets by mouth 2 times a day. 30 tablet 2   • ipratropium (ATROVENT HFA) 17 MCG/ACT Aero Soln Inhale 2 Puffs every 6 hours.     • albuterol 108 (90 Base) MCG/ACT Aero Soln inhalation aerosol Inhale 2 Puffs every 6 hours as needed for Shortness of Breath. 8.5 g 3   • Omega-3 Fatty Acids (FISH OIL) 1000 MG Cap capsule Take 1,000 mg by mouth 1 time a day as needed.     • Methylsulfonylmethane (MSM PO) Take  by mouth.       No current facility-administered medications for this visit.       Patient Active Problem List    Diagnosis Date Noted   • Colitis 08/06/2021   • Pulmonary nodules 04/23/2021   • SOB (shortness of breath) 04/09/2021   • Lower respiratory tract infection 04/07/2021   • Asthma due to seasonal allergies 04/07/2021   • Prediabetes 04/01/2021   • History of posttraumatic stress disorder (PTSD) 04/01/2021   • Major depressive disorder 04/01/2021   • Vitamin D deficiency 04/01/2021   • Perimenopausal symptoms 04/01/2021   • Palpitations 04/13/2020   • Morbid obesity with BMI of 40.0-44.9, adult (HCC) 05/30/2017   • Hypothyroid 05/17/2016   • Family history of diabetes mellitus (DM) 03/31/2016   • Chronic low back pain 03/31/2016   • Upper back pain 03/31/2016   • Insomnia 03/31/2016       Family History   Problem Relation Age of Onset   • Lung Disease Mother         COPD   • Hypertension Mother         Pulmonary hypertension   • Arthritis Mother    • Cancer Mother         melanoma (skin)   • Lupus Mother    • Mult Sclerosis Father    • Hypertension Brother    • Alcohol abuse Brother    • Cancer Maternal Grandmother         breast ca   • Diabetes Maternal Grandfather    • Heart Disease Maternal Grandfather    • Hypertension  "Maternal Grandfather    • Hyperlipidemia Maternal Grandfather    • Heart Attack Maternal Grandfather    • No Known Problems Paternal Grandfather    • Cancer Maternal Aunt         breast       She  has a past medical history of Allergy, Anxiety, Arrhythmia, Asthma, Colitis (8/6/2021), Depression, Diabetes (HCC), GERD (gastroesophageal reflux disease), Head ache, Hypertension, Migraine, Pain of right eye (6/17/2016), Painful breathing, Shortness of breath, Thyroid disease, and Wart (6/17/2016). She also has no past medical history of Anemia, Blood transfusion without reported diagnosis, Cancer (HCC), Clotting disorder (HCC), COPD (chronic obstructive pulmonary disease) (HCC), Cough, Diabetic neuropathy (HCC), Heart attack (HCC), Heart murmur, Hyperlipidemia, Kidney disease, Pulmonary emphysema (HCC), Seizure (HCC), Sputum production, Stroke (HCC), Substance abuse (HCC), or Wheezing.  She  has a past surgical history that includes carpal tunnel release (Right, 2012) and cholecystectomy (2011).       Objective:   Ht 1.727 m (5' 8\")   Wt (!) 131 kg (288 lb)   BMI 43.79 kg/m²     Physical Exam   Constitutional: Alert, no distress, well-groomed.  Respiratory: Unlabored respiratory effort, no cough.  Psych: Alert and oriented x3, normal affect and mood.        Assessment and Plan:   The following treatment plan was discussed:     1. Colitis  - cefdinir (OMNICEF) 300 MG Cap; Take 1 Capsule by mouth 2 times a day for 10 days.  Dispense: 20 Capsule; Refill: 0  - metroNIDAZOLE (FLAGYL) 500 MG Tab; Take 1 Tablet by mouth 2 times a day for 10 days.  Dispense: 20 Tablet; Refill: 0  Will have patient complete stool studies and then complete another course of antibiotics. She states overall the antibiotics did help her symptoms to about 75% but is still having some bothersome symptoms per HPI. Broadening workup to the stool studies per #3. Pt encouraged to call GI and make an appointment as soon as possible.     2. Chronic midline " low back pain with left-sided sciatica  - MR-LUMBAR SPINE-W/O; Future  - DX-LUMBAR SPINE-2 OR 3 VIEWS; Future  - pregabalin (LYRICA) 50 MG capsule; Take 1 Capsule by mouth at bedtime for 90 days.  Dispense: 30 Capsule; Refill: 2  Pt reporting worsening numbness in the feet with PT, will proceed with xray and MRI of the lumbar spine. Increasing lyrica to 50mg - she reports drowsiness so only takes it at nighttime.    3. Diarrhea, unspecified type  - H.PYLORI STOOL ANTIGEN; Future  - C Diff by PCR rflx Toxin; Future  - CULTURE STOOL; Future  - OCCULT BLOOD STOOL; Future  - CALPROTECTIN,FECAL; Future        Follow-up: Return patient to call back for appt when MRI is completed.

## 2021-09-04 NOTE — ASSESSMENT & PLAN NOTE
Pt started to get burning sensation in her legs during therapy, so PT had to back off of the therapy. She has had about 6 sessions now. She has not felt improvement in her low back.

## 2021-09-09 ENCOUNTER — APPOINTMENT (OUTPATIENT)
Dept: RADIOLOGY | Facility: MEDICAL CENTER | Age: 46
End: 2021-09-09
Attending: FAMILY MEDICINE
Payer: MEDICAID

## 2021-09-09 DIAGNOSIS — G89.29 CHRONIC MIDLINE LOW BACK PAIN WITH LEFT-SIDED SCIATICA: ICD-10-CM

## 2021-09-09 DIAGNOSIS — M54.42 CHRONIC MIDLINE LOW BACK PAIN WITH LEFT-SIDED SCIATICA: ICD-10-CM

## 2021-09-09 PROCEDURE — 72100 X-RAY EXAM L-S SPINE 2/3 VWS: CPT

## 2021-09-09 PROCEDURE — 72148 MRI LUMBAR SPINE W/O DYE: CPT

## 2021-09-10 DIAGNOSIS — G89.29 CHRONIC MIDLINE LOW BACK PAIN WITH LEFT-SIDED SCIATICA: ICD-10-CM

## 2021-09-10 DIAGNOSIS — M54.42 CHRONIC MIDLINE LOW BACK PAIN WITH LEFT-SIDED SCIATICA: ICD-10-CM

## 2021-09-28 ENCOUNTER — OFFICE VISIT (OUTPATIENT)
Dept: MEDICAL GROUP | Facility: MEDICAL CENTER | Age: 46
End: 2021-09-28
Attending: PHYSICIAN ASSISTANT
Payer: MEDICAID

## 2021-09-28 VITALS
SYSTOLIC BLOOD PRESSURE: 130 MMHG | HEART RATE: 85 BPM | HEIGHT: 69 IN | WEIGHT: 289 LBS | OXYGEN SATURATION: 95 % | BODY MASS INDEX: 42.8 KG/M2 | DIASTOLIC BLOOD PRESSURE: 102 MMHG | RESPIRATION RATE: 17 BRPM | TEMPERATURE: 97.1 F

## 2021-09-28 DIAGNOSIS — G89.29 CHRONIC MIDLINE LOW BACK PAIN WITH LEFT-SIDED SCIATICA: ICD-10-CM

## 2021-09-28 DIAGNOSIS — M54.42 CHRONIC MIDLINE LOW BACK PAIN WITH LEFT-SIDED SCIATICA: ICD-10-CM

## 2021-09-28 DIAGNOSIS — S29.012A RHOMBOID MUSCLE STRAIN, INITIAL ENCOUNTER: ICD-10-CM

## 2021-09-28 DIAGNOSIS — H93.13 EAR RINGING SOUND, BILATERAL: ICD-10-CM

## 2021-09-28 DIAGNOSIS — S46.819A STRAIN OF TRAPEZIUS MUSCLE, UNSPECIFIED LATERALITY, INITIAL ENCOUNTER: ICD-10-CM

## 2021-09-28 DIAGNOSIS — Z23 NEED FOR VACCINATION: ICD-10-CM

## 2021-09-28 PROBLEM — Z09 HOSPITAL DISCHARGE FOLLOW-UP: Status: ACTIVE | Noted: 2021-09-28

## 2021-09-28 PROCEDURE — 90686 IIV4 VACC NO PRSV 0.5 ML IM: CPT

## 2021-09-28 PROCEDURE — 99214 OFFICE O/P EST MOD 30 MIN: CPT | Performed by: PHYSICIAN ASSISTANT

## 2021-09-28 PROCEDURE — 99212 OFFICE O/P EST SF 10 MIN: CPT | Mod: 25 | Performed by: PHYSICIAN ASSISTANT

## 2021-09-28 RX ORDER — PREGABALIN 100 MG/1
100 CAPSULE ORAL 3 TIMES DAILY
Qty: 90 CAPSULE | Refills: 0 | Status: SHIPPED | OUTPATIENT
Start: 2021-09-28 | End: 2021-11-10 | Stop reason: SDUPTHER

## 2021-09-28 ASSESSMENT — FIBROSIS 4 INDEX: FIB4 SCORE: 0.96
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
## 2021-09-29 ENCOUNTER — APPOINTMENT (OUTPATIENT)
Dept: SLEEP MEDICINE | Facility: MEDICAL CENTER | Age: 46
End: 2021-09-29
Payer: MEDICAID
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
## 2021-10-26 ENCOUNTER — OFFICE VISIT (OUTPATIENT)
Dept: MEDICAL GROUP | Facility: MEDICAL CENTER | Age: 46
End: 2021-10-26
Attending: PHYSICIAN ASSISTANT
Payer: OTHER MISCELLANEOUS

## 2021-10-26 DIAGNOSIS — S29.012D STRAIN OF RHOMBOID MUSCLE, SUBSEQUENT ENCOUNTER: ICD-10-CM

## 2021-10-26 DIAGNOSIS — R22.42 LOCALIZED SWELLING OF LEFT LOWER LEG: ICD-10-CM

## 2021-10-26 DIAGNOSIS — S46.812D TRAPEZIUS STRAIN, LEFT, SUBSEQUENT ENCOUNTER: ICD-10-CM
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
## 2021-10-26 PROBLEM — Z09 HOSPITAL DISCHARGE FOLLOW-UP: Status: RESOLVED | Noted: 2021-09-28 | Resolved: 2021-10-26

## 2021-10-26 PROCEDURE — 99213 OFFICE O/P EST LOW 20 MIN: CPT | Performed by: PHYSICIAN ASSISTANT

## 2021-10-26 PROCEDURE — 99214 OFFICE O/P EST MOD 30 MIN: CPT | Performed by: PHYSICIAN ASSISTANT

## 2021-10-27 VITALS
OXYGEN SATURATION: 94 % | TEMPERATURE: 98.1 F | SYSTOLIC BLOOD PRESSURE: 130 MMHG | DIASTOLIC BLOOD PRESSURE: 80 MMHG | HEIGHT: 69 IN | WEIGHT: 290 LBS | HEART RATE: 78 BPM | RESPIRATION RATE: 17 BRPM | BODY MASS INDEX: 42.95 KG/M2

## 2021-10-27 ASSESSMENT — FIBROSIS 4 INDEX: FIB4 SCORE: 0.98

## 2021-10-27 NOTE — ASSESSMENT & PLAN NOTE
Rina presents today for follow-up.  She reports that over the past 4 weeks she has been performing home PT twice a day.  She reports that she has even utilize YouTube for access to more extensive exercises for rehabilitation.  She states that despite her best efforts without home PT, she has continued to experience pain located at the trapezius and rhomboid region of the right posterior shoulder/back/neck.
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
## 2021-11-04 ENCOUNTER — TELEPHONE (OUTPATIENT)
Dept: SCHEDULING | Facility: IMAGING CENTER | Age: 46
End: 2021-11-04

## 2021-11-04 ENCOUNTER — APPOINTMENT (OUTPATIENT)
Dept: MEDICAL GROUP | Facility: CLINIC | Age: 46
End: 2021-11-04
Payer: MEDICAID

## 2021-11-10 DIAGNOSIS — M54.42 CHRONIC MIDLINE LOW BACK PAIN WITH LEFT-SIDED SCIATICA: ICD-10-CM

## 2021-11-10 DIAGNOSIS — G89.29 CHRONIC MIDLINE LOW BACK PAIN WITH LEFT-SIDED SCIATICA: ICD-10-CM

## 2021-11-10 RX ORDER — PREGABALIN 100 MG/1
100 CAPSULE ORAL 3 TIMES DAILY
Qty: 90 CAPSULE | Refills: 0 | Status: SHIPPED | OUTPATIENT
Start: 2021-11-10 | End: 2021-12-10

## 2021-11-10 RX ORDER — METHOCARBAMOL 500 MG/1
500 TABLET, FILM COATED ORAL 4 TIMES DAILY PRN
Qty: 60 TABLET | Refills: 3 | Status: SHIPPED | OUTPATIENT
Start: 2021-11-10 | End: 2022-01-07 | Stop reason: SDUPTHER

## 2021-12-08 ENCOUNTER — HOSPITAL ENCOUNTER (OUTPATIENT)
Dept: RADIOLOGY | Facility: MEDICAL CENTER | Age: 46
End: 2021-12-08
Attending: COLON & RECTAL SURGERY
Payer: MEDICAID

## 2021-12-08 ENCOUNTER — HOSPITAL ENCOUNTER (OUTPATIENT)
Dept: LAB | Facility: MEDICAL CENTER | Age: 46
End: 2021-12-08
Attending: COLON & RECTAL SURGERY
Payer: MEDICAID

## 2021-12-08 DIAGNOSIS — H93.19 TINNITUS, UNSPECIFIED LATERALITY: ICD-10-CM

## 2021-12-08 DIAGNOSIS — E66.01 MORBID OBESITY (HCC): ICD-10-CM

## 2021-12-08 DIAGNOSIS — K21.9 GASTROESOPHAGEAL REFLUX DISEASE, UNSPECIFIED WHETHER ESOPHAGITIS PRESENT: ICD-10-CM

## 2021-12-08 DIAGNOSIS — Z01.818 PREOP EXAMINATION: ICD-10-CM

## 2021-12-08 LAB
ALBUMIN SERPL BCP-MCNC: 4.7 G/DL (ref 3.2–4.9)
ALBUMIN/GLOB SERPL: 1.5 G/DL
ALP SERPL-CCNC: 71 U/L (ref 30–99)
ALT SERPL-CCNC: 20 U/L (ref 2–50)
ANION GAP SERPL CALC-SCNC: 13 MMOL/L (ref 7–16)
AST SERPL-CCNC: 21 U/L (ref 12–45)
BASOPHILS # BLD AUTO: 0.6 % (ref 0–1.8)
BASOPHILS # BLD: 0.05 K/UL (ref 0–0.12)
BILIRUB SERPL-MCNC: 0.3 MG/DL (ref 0.1–1.5)
BUN SERPL-MCNC: 15 MG/DL (ref 8–22)
CALCIUM SERPL-MCNC: 9.7 MG/DL (ref 8.5–10.5)
CHLORIDE SERPL-SCNC: 103 MMOL/L (ref 96–112)
CO2 SERPL-SCNC: 22 MMOL/L (ref 20–33)
CREAT SERPL-MCNC: 0.72 MG/DL (ref 0.5–1.4)
EOSINOPHIL # BLD AUTO: 0.16 K/UL (ref 0–0.51)
EOSINOPHIL NFR BLD: 1.8 % (ref 0–6.9)
ERYTHROCYTE [DISTWIDTH] IN BLOOD BY AUTOMATED COUNT: 46.8 FL (ref 35.9–50)
EST. AVERAGE GLUCOSE BLD GHB EST-MCNC: 111 MG/DL
GLOBULIN SER CALC-MCNC: 3.1 G/DL (ref 1.9–3.5)
GLUCOSE SERPL-MCNC: 103 MG/DL (ref 65–99)
HBA1C MFR BLD: 5.5 % (ref 4–5.6)
HCT VFR BLD AUTO: 43.1 % (ref 37–47)
HGB BLD-MCNC: 14.1 G/DL (ref 12–16)
IMM GRANULOCYTES # BLD AUTO: 0.05 K/UL (ref 0–0.11)
IMM GRANULOCYTES NFR BLD AUTO: 0.6 % (ref 0–0.9)
LYMPHOCYTES # BLD AUTO: 1.97 K/UL (ref 1–4.8)
LYMPHOCYTES NFR BLD: 21.8 % (ref 22–41)
MCH RBC QN AUTO: 32 PG (ref 27–33)
MCHC RBC AUTO-ENTMCNC: 32.7 G/DL (ref 33.6–35)
MCV RBC AUTO: 98 FL (ref 81.4–97.8)
MONOCYTES # BLD AUTO: 0.49 K/UL (ref 0–0.85)
MONOCYTES NFR BLD AUTO: 5.4 % (ref 0–13.4)
NEUTROPHILS # BLD AUTO: 6.31 K/UL (ref 2–7.15)
NEUTROPHILS NFR BLD: 69.8 % (ref 44–72)
NRBC # BLD AUTO: 0 K/UL
NRBC BLD-RTO: 0 /100 WBC
PLATELET # BLD AUTO: 245 K/UL (ref 164–446)
PMV BLD AUTO: 11.5 FL (ref 9–12.9)
POTASSIUM SERPL-SCNC: 4.4 MMOL/L (ref 3.6–5.5)
PROT SERPL-MCNC: 7.8 G/DL (ref 6–8.2)
RBC # BLD AUTO: 4.4 M/UL (ref 4.2–5.4)
SODIUM SERPL-SCNC: 138 MMOL/L (ref 135–145)
TSH SERPL DL<=0.005 MIU/L-ACNC: 2.31 UIU/ML (ref 0.38–5.33)
WBC # BLD AUTO: 9 K/UL (ref 4.8–10.8)

## 2021-12-08 PROCEDURE — 83036 HEMOGLOBIN GLYCOSYLATED A1C: CPT

## 2021-12-08 PROCEDURE — 71046 X-RAY EXAM CHEST 2 VIEWS: CPT

## 2021-12-08 PROCEDURE — 84443 ASSAY THYROID STIM HORMONE: CPT

## 2021-12-08 PROCEDURE — 74240 X-RAY XM UPR GI TRC 1CNTRST: CPT

## 2021-12-08 PROCEDURE — 85025 COMPLETE CBC W/AUTO DIFF WBC: CPT

## 2021-12-08 PROCEDURE — 36415 COLL VENOUS BLD VENIPUNCTURE: CPT

## 2021-12-08 PROCEDURE — 80053 COMPREHEN METABOLIC PANEL: CPT

## 2021-12-23 ENCOUNTER — HOSPITAL ENCOUNTER (OUTPATIENT)
Dept: CARDIOLOGY | Facility: MEDICAL CENTER | Age: 46
End: 2021-12-23
Attending: COLON & RECTAL SURGERY
Payer: MEDICAID

## 2021-12-23 LAB — EKG IMPRESSION: NORMAL

## 2021-12-23 PROCEDURE — 93010 ELECTROCARDIOGRAM REPORT: CPT | Performed by: INTERNAL MEDICINE

## 2021-12-23 PROCEDURE — 93005 ELECTROCARDIOGRAM TRACING: CPT | Performed by: COLON & RECTAL SURGERY

## 2022-01-04 DIAGNOSIS — S29.012D STRAIN OF RHOMBOID MUSCLE, SUBSEQUENT ENCOUNTER: ICD-10-CM

## 2022-01-04 DIAGNOSIS — S46.812D TRAPEZIUS STRAIN, LEFT, SUBSEQUENT ENCOUNTER: ICD-10-CM

## 2022-01-04 DIAGNOSIS — G89.29 CHRONIC MIDLINE LOW BACK PAIN WITH LEFT-SIDED SCIATICA: ICD-10-CM

## 2022-01-04 DIAGNOSIS — M54.42 CHRONIC MIDLINE LOW BACK PAIN WITH LEFT-SIDED SCIATICA: ICD-10-CM

## 2022-01-10 ENCOUNTER — TELEPHONE (OUTPATIENT)
Dept: MEDICAL GROUP | Facility: MEDICAL CENTER | Age: 47
End: 2022-01-10

## 2022-01-12 ENCOUNTER — PATIENT MESSAGE (OUTPATIENT)
Dept: MEDICAL GROUP | Facility: MEDICAL CENTER | Age: 47
End: 2022-01-12

## 2022-01-12 NOTE — TELEPHONE ENCOUNTER
From: Rina Roger  To: Physician Assistant Michelle Camara  Sent: 1/12/2022 10:11 AM PST  Subject: Refill     Hello, I think my Doctor may be out. I am out of my medication. Lyrica and methocarbamol. I really need a refill at the [facility].   Not sure why these won’t refill. Do I need to come in for a visit?

## 2022-01-14 DIAGNOSIS — M54.42 CHRONIC MIDLINE LOW BACK PAIN WITH LEFT-SIDED SCIATICA: ICD-10-CM

## 2022-01-14 DIAGNOSIS — G89.29 CHRONIC MIDLINE LOW BACK PAIN WITH LEFT-SIDED SCIATICA: ICD-10-CM

## 2022-01-14 RX ORDER — PREGABALIN 100 MG/1
100 CAPSULE ORAL
Qty: 90 CAPSULE | Refills: 2 | Status: SHIPPED | OUTPATIENT
Start: 2022-01-14 | End: 2022-03-21 | Stop reason: SDUPTHER

## 2022-01-19 ENCOUNTER — PRE-ADMISSION TESTING (OUTPATIENT)
Dept: ADMISSIONS | Facility: MEDICAL CENTER | Age: 47
DRG: 621 | End: 2022-01-19
Attending: COLON & RECTAL SURGERY
Payer: MEDICAID

## 2022-01-19 DIAGNOSIS — Z01.812 PRE-OPERATIVE LABORATORY EXAMINATION: ICD-10-CM

## 2022-01-19 LAB
25(OH)D3 SERPL-MCNC: 22 NG/ML (ref 30–100)
ALBUMIN SERPL BCP-MCNC: 4.5 G/DL (ref 3.2–4.9)
ALBUMIN/GLOB SERPL: 1.5 G/DL
ALP SERPL-CCNC: 68 U/L (ref 30–99)
ALT SERPL-CCNC: 23 U/L (ref 2–50)
ANION GAP SERPL CALC-SCNC: 15 MMOL/L (ref 7–16)
AST SERPL-CCNC: 24 U/L (ref 12–45)
BASOPHILS # BLD AUTO: 0.4 % (ref 0–1.8)
BASOPHILS # BLD: 0.04 K/UL (ref 0–0.12)
BILIRUB SERPL-MCNC: 0.5 MG/DL (ref 0.1–1.5)
BUN SERPL-MCNC: 19 MG/DL (ref 8–22)
CALCIUM SERPL-MCNC: 9.7 MG/DL (ref 8.5–10.5)
CHLORIDE SERPL-SCNC: 103 MMOL/L (ref 96–112)
CHOLEST SERPL-MCNC: 146 MG/DL (ref 100–199)
CO2 SERPL-SCNC: 18 MMOL/L (ref 20–33)
CREAT SERPL-MCNC: 0.72 MG/DL (ref 0.5–1.4)
EOSINOPHIL # BLD AUTO: 0.15 K/UL (ref 0–0.51)
EOSINOPHIL NFR BLD: 1.5 % (ref 0–6.9)
ERYTHROCYTE [DISTWIDTH] IN BLOOD BY AUTOMATED COUNT: 49.6 FL (ref 35.9–50)
EST. AVERAGE GLUCOSE BLD GHB EST-MCNC: 117 MG/DL
FERRITIN SERPL-MCNC: 252 NG/ML (ref 10–291)
FOLATE SERPL-MCNC: 21.5 NG/ML
GLOBULIN SER CALC-MCNC: 3 G/DL (ref 1.9–3.5)
GLUCOSE SERPL-MCNC: 100 MG/DL (ref 65–99)
HBA1C MFR BLD: 5.7 % (ref 4–5.6)
HCG SERPL QL: NEGATIVE
HCT VFR BLD AUTO: 39.9 % (ref 37–47)
HDLC SERPL-MCNC: 30 MG/DL
HGB BLD-MCNC: 13.1 G/DL (ref 12–16)
IMM GRANULOCYTES # BLD AUTO: 0.07 K/UL (ref 0–0.11)
IMM GRANULOCYTES NFR BLD AUTO: 0.7 % (ref 0–0.9)
INR PPP: 1.03 (ref 0.87–1.13)
IRON SATN MFR SERPL: 41 % (ref 15–55)
IRON SERPL-MCNC: 104 UG/DL (ref 40–170)
LDLC SERPL CALC-MCNC: 82 MG/DL
LYMPHOCYTES # BLD AUTO: 2.38 K/UL (ref 1–4.8)
LYMPHOCYTES NFR BLD: 24.1 % (ref 22–41)
MCH RBC QN AUTO: 32.1 PG (ref 27–33)
MCHC RBC AUTO-ENTMCNC: 32.8 G/DL (ref 33.6–35)
MCV RBC AUTO: 97.8 FL (ref 81.4–97.8)
MONOCYTES # BLD AUTO: 0.55 K/UL (ref 0–0.85)
MONOCYTES NFR BLD AUTO: 5.6 % (ref 0–13.4)
NEUTROPHILS # BLD AUTO: 6.67 K/UL (ref 2–7.15)
NEUTROPHILS NFR BLD: 67.7 % (ref 44–72)
NRBC # BLD AUTO: 0 K/UL
NRBC BLD-RTO: 0 /100 WBC
PLATELET # BLD AUTO: 244 K/UL (ref 164–446)
PMV BLD AUTO: 10.9 FL (ref 9–12.9)
POTASSIUM SERPL-SCNC: 4.1 MMOL/L (ref 3.6–5.5)
PREALB SERPL-MCNC: 23.2 MG/DL (ref 18–38)
PROT SERPL-MCNC: 7.5 G/DL (ref 6–8.2)
PROTHROMBIN TIME: 13.2 SEC (ref 12–14.6)
RBC # BLD AUTO: 4.08 M/UL (ref 4.2–5.4)
SODIUM SERPL-SCNC: 136 MMOL/L (ref 135–145)
TIBC SERPL-MCNC: 251 UG/DL (ref 250–450)
TRANSFERRIN SERPL-MCNC: 211 MG/DL (ref 200–370)
TRIGL SERPL-MCNC: 170 MG/DL (ref 0–149)
UIBC SERPL-MCNC: 147 UG/DL (ref 110–370)
VIT B12 SERPL-MCNC: 971 PG/ML (ref 211–911)
WBC # BLD AUTO: 9.9 K/UL (ref 4.8–10.8)

## 2022-01-19 PROCEDURE — 82746 ASSAY OF FOLIC ACID SERUM: CPT

## 2022-01-19 PROCEDURE — 84134 ASSAY OF PREALBUMIN: CPT

## 2022-01-19 PROCEDURE — 80061 LIPID PANEL: CPT

## 2022-01-19 PROCEDURE — 80053 COMPREHEN METABOLIC PANEL: CPT

## 2022-01-19 PROCEDURE — C9803 HOPD COVID-19 SPEC COLLECT: HCPCS

## 2022-01-19 PROCEDURE — 84207 ASSAY OF VITAMIN B-6: CPT

## 2022-01-19 PROCEDURE — 85610 PROTHROMBIN TIME: CPT

## 2022-01-19 PROCEDURE — 36415 COLL VENOUS BLD VENIPUNCTURE: CPT

## 2022-01-19 PROCEDURE — 83550 IRON BINDING TEST: CPT

## 2022-01-19 PROCEDURE — U0005 INFEC AGEN DETEC AMPLI PROBE: HCPCS

## 2022-01-19 PROCEDURE — 82306 VITAMIN D 25 HYDROXY: CPT

## 2022-01-19 PROCEDURE — 84703 CHORIONIC GONADOTROPIN ASSAY: CPT

## 2022-01-19 PROCEDURE — 85025 COMPLETE CBC W/AUTO DIFF WBC: CPT

## 2022-01-19 PROCEDURE — 84425 ASSAY OF VITAMIN B-1: CPT

## 2022-01-19 PROCEDURE — 84466 ASSAY OF TRANSFERRIN: CPT

## 2022-01-19 PROCEDURE — 82728 ASSAY OF FERRITIN: CPT

## 2022-01-19 PROCEDURE — U0003 INFECTIOUS AGENT DETECTION BY NUCLEIC ACID (DNA OR RNA); SEVERE ACUTE RESPIRATORY SYNDROME CORONAVIRUS 2 (SARS-COV-2) (CORONAVIRUS DISEASE [COVID-19]), AMPLIFIED PROBE TECHNIQUE, MAKING USE OF HIGH THROUGHPUT TECHNOLOGIES AS DESCRIBED BY CMS-2020-01-R: HCPCS

## 2022-01-19 PROCEDURE — 84630 ASSAY OF ZINC: CPT

## 2022-01-19 PROCEDURE — 82607 VITAMIN B-12: CPT

## 2022-01-19 PROCEDURE — 83036 HEMOGLOBIN GLYCOSYLATED A1C: CPT

## 2022-01-19 PROCEDURE — 83540 ASSAY OF IRON: CPT

## 2022-01-19 RX ORDER — OMEPRAZOLE 20 MG/1
20 CAPSULE, DELAYED RELEASE ORAL
COMMUNITY
End: 2022-05-10

## 2022-01-19 RX ORDER — M-VIT,TX,IRON,MINS/CALC/FOLIC 27MG-0.4MG
1 TABLET ORAL DAILY
COMMUNITY

## 2022-01-19 ASSESSMENT — FIBROSIS 4 INDEX: FIB4 SCORE: 0.88

## 2022-01-20 LAB
SARS-COV-2 RNA RESP QL NAA+PROBE: NOTDETECTED
SPECIMEN SOURCE: NORMAL

## 2022-01-21 ENCOUNTER — ANESTHESIA (OUTPATIENT)
Dept: SURGERY | Facility: MEDICAL CENTER | Age: 47
DRG: 621 | End: 2022-01-21
Payer: MEDICAID

## 2022-01-21 ENCOUNTER — ANESTHESIA EVENT (OUTPATIENT)
Dept: SURGERY | Facility: MEDICAL CENTER | Age: 47
DRG: 621 | End: 2022-01-21
Payer: MEDICAID

## 2022-01-21 ENCOUNTER — HOSPITAL ENCOUNTER (INPATIENT)
Facility: MEDICAL CENTER | Age: 47
LOS: 1 days | DRG: 621 | End: 2022-01-22
Attending: COLON & RECTAL SURGERY | Admitting: COLON & RECTAL SURGERY
Payer: MEDICAID

## 2022-01-21 LAB
PATHOLOGY CONSULT NOTE: NORMAL
ZINC SERPL-MCNC: 89.5 UG/DL (ref 60–120)

## 2022-01-21 PROCEDURE — A9270 NON-COVERED ITEM OR SERVICE: HCPCS | Performed by: ANESTHESIOLOGY

## 2022-01-21 PROCEDURE — 700111 HCHG RX REV CODE 636 W/ 250 OVERRIDE (IP): Performed by: ANESTHESIOLOGY

## 2022-01-21 PROCEDURE — 160029 HCHG SURGERY MINUTES - 1ST 30 MINS LEVEL 4: Performed by: COLON & RECTAL SURGERY

## 2022-01-21 PROCEDURE — 770001 HCHG ROOM/CARE - MED/SURG/GYN PRIV*

## 2022-01-21 PROCEDURE — 700102 HCHG RX REV CODE 250 W/ 637 OVERRIDE(OP): Performed by: ANESTHESIOLOGY

## 2022-01-21 PROCEDURE — 0DB64Z3 EXCISION OF STOMACH, PERCUTANEOUS ENDOSCOPIC APPROACH, VERTICAL: ICD-10-PCS | Performed by: COLON & RECTAL SURGERY

## 2022-01-21 PROCEDURE — 700105 HCHG RX REV CODE 258: Performed by: COLON & RECTAL SURGERY

## 2022-01-21 PROCEDURE — 700102 HCHG RX REV CODE 250 W/ 637 OVERRIDE(OP): Performed by: COLON & RECTAL SURGERY

## 2022-01-21 PROCEDURE — 700101 HCHG RX REV CODE 250: Performed by: ANESTHESIOLOGY

## 2022-01-21 PROCEDURE — 160009 HCHG ANES TIME/MIN: Performed by: COLON & RECTAL SURGERY

## 2022-01-21 PROCEDURE — 700102 HCHG RX REV CODE 250 W/ 637 OVERRIDE(OP): Performed by: PHYSICIAN ASSISTANT

## 2022-01-21 PROCEDURE — 160041 HCHG SURGERY MINUTES - EA ADDL 1 MIN LEVEL 4: Performed by: COLON & RECTAL SURGERY

## 2022-01-21 PROCEDURE — 160036 HCHG PACU - EA ADDL 30 MINS PHASE I: Performed by: COLON & RECTAL SURGERY

## 2022-01-21 PROCEDURE — A9270 NON-COVERED ITEM OR SERVICE: HCPCS | Performed by: PHYSICIAN ASSISTANT

## 2022-01-21 PROCEDURE — A9270 NON-COVERED ITEM OR SERVICE: HCPCS | Performed by: COLON & RECTAL SURGERY

## 2022-01-21 PROCEDURE — 700111 HCHG RX REV CODE 636 W/ 250 OVERRIDE (IP): Performed by: PHYSICIAN ASSISTANT

## 2022-01-21 PROCEDURE — 501399 HCHG SPECIMAN BAG, ENDO CATC: Performed by: COLON & RECTAL SURGERY

## 2022-01-21 PROCEDURE — 160048 HCHG OR STATISTICAL LEVEL 1-5: Performed by: COLON & RECTAL SURGERY

## 2022-01-21 PROCEDURE — 160035 HCHG PACU - 1ST 60 MINS PHASE I: Performed by: COLON & RECTAL SURGERY

## 2022-01-21 PROCEDURE — 160002 HCHG RECOVERY MINUTES (STAT): Performed by: COLON & RECTAL SURGERY

## 2022-01-21 PROCEDURE — 700101 HCHG RX REV CODE 250: Performed by: COLON & RECTAL SURGERY

## 2022-01-21 PROCEDURE — 501838 HCHG SUTURE GENERAL: Performed by: COLON & RECTAL SURGERY

## 2022-01-21 PROCEDURE — 501583 HCHG TROCAR, THRD CAN&SEAL 5X100: Performed by: COLON & RECTAL SURGERY

## 2022-01-21 PROCEDURE — 88307 TISSUE EXAM BY PATHOLOGIST: CPT

## 2022-01-21 PROCEDURE — 501497 HCHG SURGICLIP: Performed by: COLON & RECTAL SURGERY

## 2022-01-21 PROCEDURE — 700111 HCHG RX REV CODE 636 W/ 250 OVERRIDE (IP): Performed by: COLON & RECTAL SURGERY

## 2022-01-21 PROCEDURE — 502570 HCHG PACK, GASTRIC BANDING: Performed by: COLON & RECTAL SURGERY

## 2022-01-21 PROCEDURE — 700101 HCHG RX REV CODE 250: Performed by: PHYSICIAN ASSISTANT

## 2022-01-21 PROCEDURE — 501570 HCHG TROCAR, SEPARATOR: Performed by: COLON & RECTAL SURGERY

## 2022-01-21 RX ORDER — HYDROMORPHONE HYDROCHLORIDE 1 MG/ML
0.5 INJECTION, SOLUTION INTRAMUSCULAR; INTRAVENOUS; SUBCUTANEOUS
Status: DISCONTINUED | OUTPATIENT
Start: 2022-01-21 | End: 2022-01-22 | Stop reason: HOSPADM

## 2022-01-21 RX ORDER — OXYCODONE HCL 5 MG/5 ML
10 SOLUTION, ORAL ORAL
Status: DISCONTINUED | OUTPATIENT
Start: 2022-01-21 | End: 2022-01-22 | Stop reason: HOSPADM

## 2022-01-21 RX ORDER — ACETAMINOPHEN 10 MG/ML
1000 INJECTION, SOLUTION INTRAVENOUS EVERY 6 HOURS
Status: COMPLETED | OUTPATIENT
Start: 2022-01-22 | End: 2022-01-22

## 2022-01-21 RX ORDER — HYDROMORPHONE HYDROCHLORIDE 1 MG/ML
0.2 INJECTION, SOLUTION INTRAMUSCULAR; INTRAVENOUS; SUBCUTANEOUS
Status: DISCONTINUED | OUTPATIENT
Start: 2022-01-21 | End: 2022-01-21 | Stop reason: HOSPADM

## 2022-01-21 RX ORDER — BUPIVACAINE HYDROCHLORIDE AND EPINEPHRINE 5; 5 MG/ML; UG/ML
INJECTION, SOLUTION PERINEURAL
Status: DISCONTINUED | OUTPATIENT
Start: 2022-01-21 | End: 2022-01-21 | Stop reason: HOSPADM

## 2022-01-21 RX ORDER — LIDOCAINE HYDROCHLORIDE 20 MG/ML
INJECTION, SOLUTION EPIDURAL; INFILTRATION; INTRACAUDAL; PERINEURAL PRN
Status: DISCONTINUED | OUTPATIENT
Start: 2022-01-21 | End: 2022-01-21 | Stop reason: SURG

## 2022-01-21 RX ORDER — OXYCODONE HCL 5 MG/5 ML
10 SOLUTION, ORAL ORAL
Status: COMPLETED | OUTPATIENT
Start: 2022-01-21 | End: 2022-01-21

## 2022-01-21 RX ORDER — HALOPERIDOL 5 MG/ML
1 INJECTION INTRAMUSCULAR
Status: DISCONTINUED | OUTPATIENT
Start: 2022-01-21 | End: 2022-01-21 | Stop reason: HOSPADM

## 2022-01-21 RX ORDER — ONDANSETRON 2 MG/ML
INJECTION INTRAMUSCULAR; INTRAVENOUS PRN
Status: DISCONTINUED | OUTPATIENT
Start: 2022-01-21 | End: 2022-01-21 | Stop reason: SURG

## 2022-01-21 RX ORDER — ONDANSETRON 2 MG/ML
4 INJECTION INTRAMUSCULAR; INTRAVENOUS
Status: DISCONTINUED | OUTPATIENT
Start: 2022-01-21 | End: 2022-01-21 | Stop reason: HOSPADM

## 2022-01-21 RX ORDER — OXYCODONE HCL 5 MG/5 ML
5 SOLUTION, ORAL ORAL
Status: DISCONTINUED | OUTPATIENT
Start: 2022-01-21 | End: 2022-01-22 | Stop reason: HOSPADM

## 2022-01-21 RX ORDER — DEXMEDETOMIDINE HYDROCHLORIDE 100 UG/ML
INJECTION, SOLUTION INTRAVENOUS PRN
Status: DISCONTINUED | OUTPATIENT
Start: 2022-01-21 | End: 2022-01-21 | Stop reason: SURG

## 2022-01-21 RX ORDER — ACETAMINOPHEN 500 MG
1000 TABLET ORAL
COMMUNITY

## 2022-01-21 RX ORDER — DEXAMETHASONE SODIUM PHOSPHATE 4 MG/ML
INJECTION, SOLUTION INTRA-ARTICULAR; INTRALESIONAL; INTRAMUSCULAR; INTRAVENOUS; SOFT TISSUE PRN
Status: DISCONTINUED | OUTPATIENT
Start: 2022-01-21 | End: 2022-01-21 | Stop reason: SURG

## 2022-01-21 RX ORDER — ACETAMINOPHEN 500 MG
1000 TABLET ORAL EVERY 6 HOURS PRN
Status: DISCONTINUED | OUTPATIENT
Start: 2022-01-26 | End: 2022-01-22 | Stop reason: HOSPADM

## 2022-01-21 RX ORDER — DIPHENHYDRAMINE HYDROCHLORIDE 50 MG/ML
12.5 INJECTION INTRAMUSCULAR; INTRAVENOUS
Status: DISCONTINUED | OUTPATIENT
Start: 2022-01-21 | End: 2022-01-21 | Stop reason: HOSPADM

## 2022-01-21 RX ORDER — GABAPENTIN 300 MG/1
300 CAPSULE ORAL 3 TIMES DAILY
Status: DISCONTINUED | OUTPATIENT
Start: 2022-01-22 | End: 2022-01-22 | Stop reason: HOSPADM

## 2022-01-21 RX ORDER — ACETAMINOPHEN 10 MG/ML
1 INJECTION, SOLUTION INTRAVENOUS ONCE
Status: COMPLETED | OUTPATIENT
Start: 2022-01-21 | End: 2022-01-21

## 2022-01-21 RX ORDER — DIPHENHYDRAMINE HYDROCHLORIDE 50 MG/ML
12.5 INJECTION INTRAMUSCULAR; INTRAVENOUS EVERY 6 HOURS PRN
Status: DISCONTINUED | OUTPATIENT
Start: 2022-01-21 | End: 2022-01-22 | Stop reason: HOSPADM

## 2022-01-21 RX ORDER — OXYCODONE HCL 5 MG/5 ML
5 SOLUTION, ORAL ORAL
Status: COMPLETED | OUTPATIENT
Start: 2022-01-21 | End: 2022-01-21

## 2022-01-21 RX ORDER — HALOPERIDOL 5 MG/ML
1 INJECTION INTRAMUSCULAR EVERY 6 HOURS PRN
Status: DISCONTINUED | OUTPATIENT
Start: 2022-01-21 | End: 2022-01-22 | Stop reason: HOSPADM

## 2022-01-21 RX ORDER — HYDROMORPHONE HYDROCHLORIDE 1 MG/ML
0.4 INJECTION, SOLUTION INTRAMUSCULAR; INTRAVENOUS; SUBCUTANEOUS
Status: DISCONTINUED | OUTPATIENT
Start: 2022-01-21 | End: 2022-01-21 | Stop reason: HOSPADM

## 2022-01-21 RX ORDER — SODIUM CHLORIDE, SODIUM LACTATE, POTASSIUM CHLORIDE, CALCIUM CHLORIDE 600; 310; 30; 20 MG/100ML; MG/100ML; MG/100ML; MG/100ML
INJECTION, SOLUTION INTRAVENOUS CONTINUOUS
Status: ACTIVE | OUTPATIENT
Start: 2022-01-21 | End: 2022-01-21

## 2022-01-21 RX ORDER — GABAPENTIN 300 MG/1
300 CAPSULE ORAL
Status: COMPLETED | OUTPATIENT
Start: 2022-01-21 | End: 2022-01-21

## 2022-01-21 RX ORDER — SODIUM CHLORIDE, SODIUM LACTATE, POTASSIUM CHLORIDE, AND CALCIUM CHLORIDE .6; .31; .03; .02 G/100ML; G/100ML; G/100ML; G/100ML
500 INJECTION, SOLUTION INTRAVENOUS
Status: DISCONTINUED | OUTPATIENT
Start: 2022-01-21 | End: 2022-01-22 | Stop reason: HOSPADM

## 2022-01-21 RX ORDER — ONDANSETRON 2 MG/ML
4 INJECTION INTRAMUSCULAR; INTRAVENOUS EVERY 4 HOURS PRN
Status: DISCONTINUED | OUTPATIENT
Start: 2022-01-21 | End: 2022-01-22 | Stop reason: HOSPADM

## 2022-01-21 RX ORDER — MORPHINE SULFATE 2 MG/ML
INJECTION, SOLUTION INTRAMUSCULAR; INTRAVENOUS PRN
Status: DISCONTINUED | OUTPATIENT
Start: 2022-01-21 | End: 2022-01-21 | Stop reason: SURG

## 2022-01-21 RX ORDER — HYDROMORPHONE HYDROCHLORIDE 1 MG/ML
0.1 INJECTION, SOLUTION INTRAMUSCULAR; INTRAVENOUS; SUBCUTANEOUS
Status: DISCONTINUED | OUTPATIENT
Start: 2022-01-21 | End: 2022-01-21 | Stop reason: HOSPADM

## 2022-01-21 RX ORDER — SODIUM CHLORIDE AND POTASSIUM CHLORIDE 150; 900 MG/100ML; MG/100ML
INJECTION, SOLUTION INTRAVENOUS CONTINUOUS
Status: DISCONTINUED | OUTPATIENT
Start: 2022-01-21 | End: 2022-01-22 | Stop reason: HOSPADM

## 2022-01-21 RX ORDER — ACETAMINOPHEN 500 MG
1000 TABLET ORAL EVERY 6 HOURS
Status: DISCONTINUED | OUTPATIENT
Start: 2022-01-22 | End: 2022-01-22 | Stop reason: HOSPADM

## 2022-01-21 RX ORDER — CEFAZOLIN SODIUM 1 G/3ML
INJECTION, POWDER, FOR SOLUTION INTRAMUSCULAR; INTRAVENOUS PRN
Status: DISCONTINUED | OUTPATIENT
Start: 2022-01-21 | End: 2022-01-21 | Stop reason: SURG

## 2022-01-21 RX ORDER — ENALAPRILAT 1.25 MG/ML
2.5 INJECTION INTRAVENOUS EVERY 6 HOURS PRN
Status: DISCONTINUED | OUTPATIENT
Start: 2022-01-21 | End: 2022-01-22 | Stop reason: HOSPADM

## 2022-01-21 RX ORDER — CALCIUM CARBONATE 500 MG/1
500 TABLET, CHEWABLE ORAL
Status: DISCONTINUED | OUTPATIENT
Start: 2022-01-21 | End: 2022-01-22 | Stop reason: HOSPADM

## 2022-01-21 RX ORDER — PROMETHAZINE HYDROCHLORIDE 25 MG/1
25 SUPPOSITORY RECTAL EVERY 4 HOURS PRN
Status: DISCONTINUED | OUTPATIENT
Start: 2022-01-21 | End: 2022-01-22 | Stop reason: HOSPADM

## 2022-01-21 RX ADMIN — MORPHINE SULFATE 5 MG: 2 INJECTION, SOLUTION INTRAMUSCULAR; INTRAVENOUS at 14:29

## 2022-01-21 RX ADMIN — HYDROMORPHONE HYDROCHLORIDE 0.4 MG: 1 INJECTION, SOLUTION INTRAMUSCULAR; INTRAVENOUS; SUBCUTANEOUS at 15:37

## 2022-01-21 RX ADMIN — SUGAMMADEX 200 MG: 100 INJECTION, SOLUTION INTRAVENOUS at 14:22

## 2022-01-21 RX ADMIN — OXYCODONE HYDROCHLORIDE 10 MG: 5 SOLUTION ORAL at 21:33

## 2022-01-21 RX ADMIN — ONDANSETRON 4 MG: 2 INJECTION INTRAMUSCULAR; INTRAVENOUS at 14:19

## 2022-01-21 RX ADMIN — ROCURONIUM BROMIDE 50 MG: 10 INJECTION, SOLUTION INTRAVENOUS at 13:46

## 2022-01-21 RX ADMIN — POTASSIUM CHLORIDE AND SODIUM CHLORIDE: 900; 150 INJECTION, SOLUTION INTRAVENOUS at 21:34

## 2022-01-21 RX ADMIN — HALOPERIDOL LACTATE 1 MG: 5 INJECTION, SOLUTION INTRAMUSCULAR at 22:16

## 2022-01-21 RX ADMIN — DEXMEDETOMIDINE 50 MCG: 200 INJECTION, SOLUTION INTRAVENOUS at 13:51

## 2022-01-21 RX ADMIN — DEXMEDETOMIDINE 30 MCG: 200 INJECTION, SOLUTION INTRAVENOUS at 14:22

## 2022-01-21 RX ADMIN — PROPOFOL 300 MG: 10 INJECTION, EMULSION INTRAVENOUS at 13:46

## 2022-01-21 RX ADMIN — LIDOCAINE HYDROCHLORIDE 100 MG: 20 INJECTION, SOLUTION EPIDURAL; INFILTRATION; INTRACAUDAL at 13:46

## 2022-01-21 RX ADMIN — HYDROMORPHONE HYDROCHLORIDE 0.2 MG: 1 INJECTION, SOLUTION INTRAMUSCULAR; INTRAVENOUS; SUBCUTANEOUS at 16:30

## 2022-01-21 RX ADMIN — OXYCODONE HYDROCHLORIDE 10 MG: 5 SOLUTION ORAL at 15:12

## 2022-01-21 RX ADMIN — DEXAMETHASONE SODIUM PHOSPHATE 4 MG: 4 INJECTION, SOLUTION INTRA-ARTICULAR; INTRALESIONAL; INTRAMUSCULAR; INTRAVENOUS; SOFT TISSUE at 14:19

## 2022-01-21 RX ADMIN — SODIUM CHLORIDE, POTASSIUM CHLORIDE, SODIUM LACTATE AND CALCIUM CHLORIDE: 600; 310; 30; 20 INJECTION, SOLUTION INTRAVENOUS at 13:40

## 2022-01-21 RX ADMIN — GABAPENTIN 300 MG: 300 CAPSULE ORAL at 13:02

## 2022-01-21 RX ADMIN — ACETAMINOPHEN 1 G: 10 INJECTION, SOLUTION INTRAVENOUS at 13:02

## 2022-01-21 RX ADMIN — HYDROMORPHONE HYDROCHLORIDE 0.2 MG: 1 INJECTION, SOLUTION INTRAMUSCULAR; INTRAVENOUS; SUBCUTANEOUS at 15:15

## 2022-01-21 RX ADMIN — ONDANSETRON 4 MG: 2 INJECTION INTRAMUSCULAR; INTRAVENOUS at 21:33

## 2022-01-21 RX ADMIN — HALOPERIDOL LACTATE 1 MG: 5 INJECTION, SOLUTION INTRAMUSCULAR at 15:12

## 2022-01-21 RX ADMIN — FAMOTIDINE 20 MG: 10 INJECTION INTRAVENOUS at 21:33

## 2022-01-21 RX ADMIN — CEFAZOLIN 2 G: 330 INJECTION, POWDER, FOR SOLUTION INTRAMUSCULAR; INTRAVENOUS at 13:46

## 2022-01-21 RX ADMIN — HYDROMORPHONE HYDROCHLORIDE 0.2 MG: 1 INJECTION, SOLUTION INTRAMUSCULAR; INTRAVENOUS; SUBCUTANEOUS at 19:24

## 2022-01-21 RX ADMIN — SODIUM CHLORIDE, POTASSIUM CHLORIDE, SODIUM LACTATE AND CALCIUM CHLORIDE: 600; 310; 30; 20 INJECTION, SOLUTION INTRAVENOUS at 13:02

## 2022-01-21 RX ADMIN — ACETAMINOPHEN 1000 MG: 10 INJECTION, SOLUTION INTRAVENOUS at 23:58

## 2022-01-21 ASSESSMENT — LIFESTYLE VARIABLES
EVER FELT BAD OR GUILTY ABOUT YOUR DRINKING: NO
HAVE YOU EVER FELT YOU SHOULD CUT DOWN ON YOUR DRINKING: NO
AVERAGE NUMBER OF DAYS PER WEEK YOU HAVE A DRINK CONTAINING ALCOHOL: 0
HOW MANY TIMES IN THE PAST YEAR HAVE YOU HAD 5 OR MORE DRINKS IN A DAY: 0
TOTAL SCORE: 0
HAVE PEOPLE ANNOYED YOU BY CRITICIZING YOUR DRINKING: NO
EVER HAD A DRINK FIRST THING IN THE MORNING TO STEADY YOUR NERVES TO GET RID OF A HANGOVER: NO
CONSUMPTION TOTAL: NEGATIVE
DOES PATIENT WANT TO STOP DRINKING: NO
TOTAL SCORE: 0
TOTAL SCORE: 0
ON A TYPICAL DAY WHEN YOU DRINK ALCOHOL HOW MANY DRINKS DO YOU HAVE: 0
ALCOHOL_USE: NO

## 2022-01-21 ASSESSMENT — PATIENT HEALTH QUESTIONNAIRE - PHQ9
2. FEELING DOWN, DEPRESSED, IRRITABLE, OR HOPELESS: NOT AT ALL
1. LITTLE INTEREST OR PLEASURE IN DOING THINGS: NOT AT ALL
SUM OF ALL RESPONSES TO PHQ9 QUESTIONS 1 AND 2: 0

## 2022-01-21 ASSESSMENT — PAIN DESCRIPTION - PAIN TYPE
TYPE: SURGICAL PAIN

## 2022-01-21 ASSESSMENT — FIBROSIS 4 INDEX: FIB4 SCORE: 0.94

## 2022-01-21 NOTE — ANESTHESIA PREPROCEDURE EVALUATION
Case: [id number] Date/Time: 01/21/22 1315    Procedure: GASTRECTOMY, SLEEVE, LAPAROSCOPIC    Pre-op diagnosis: MORBID OBESITY    Location: [place] [id number] / SURGERY [facility]    Surgeons: Lane De León M.D.          Relevant Problems   PULMONARY   (positive) Asthma due to seasonal allergies   (positive) SOB (shortness of breath)      NEURO   (positive) History of posttraumatic stress disorder (PTSD)      ENDO   (positive) Hypothyroid       Physical Exam    Airway   Mallampati: II  TM distance: >3 FB  Neck ROM: full       Cardiovascular - normal exam  Rhythm: regular  Rate: normal  (-) murmur     Dental - normal exam           Pulmonary - normal exam  Breath sounds clear to auscultation     Abdominal    Neurological - normal exam                 Anesthesia Plan    ASA 2       Plan - general       Airway plan will be ETT          Induction: intravenous      Pertinent diagnostic labs and testing reviewed    Informed Consent:    Anesthetic plan and risks discussed with patient.

## 2022-01-21 NOTE — ANESTHESIA TIME REPORT
Anesthesia Start and Stop Event Times     Date Time Event    1/21/2022 1337 Ready for Procedure     1340 Anesthesia Start     1439 Anesthesia Stop        Responsible Staff  01/21/22    Name Role Begin End    Christian Buchanan M.D. Anesth 1340 1439        Preop Diagnosis (Free Text):  Pre-op Diagnosis     MORBID OBESITY        Preop Diagnosis (Codes):    Premium Reason  Non-Premium    Comments:

## 2022-01-21 NOTE — OR NURSING
1436: Pt arrives to PACU asleep and calm. VSS. 4 sites to abdomen with band aids over them, CDI. Ice pack applied.    1500: Pt now resting comfortably.    1700: Pts mom called and updated.    1925: Pt states pain is tolerable and denies nausea. Dressings CDI. Report called to Devika ROBERTSON.

## 2022-01-21 NOTE — ANESTHESIA PROCEDURE NOTES
Airway    Date/Time: 1/21/2022 1:46 PM  Performed by: Christian Buchanan M.D.  Authorized by: Christian Buchanan M.D.     Location:  OR  Urgency:  Elective  Indications for Airway Management:  Anesthesia      Spontaneous Ventilation: absent    Sedation Level:  Deep  Preoxygenated: Yes    Patient Position:  Sniffing  Final Airway Type:  Endotracheal airway  Final Endotracheal Airway:  ETT  Cuffed: Yes    Technique Used for Successful ETT Placement:  Direct laryngoscopy    Insertion Site:  Oral  Blade Type:    Laryngoscope Blade/Videolaryngoscope Blade Size:  3  ETT Size (mm):  7.5  Measured from:  Teeth  ETT to Teeth (cm):  22  Placement Verified by: auscultation and capnometry    Cormack-Lehane Classification:  Grade I - full view of glottis  Number of Attempts at Approach:  1

## 2022-01-21 NOTE — OP REPORT
NAME:  Rina Roger  MRN:  [id number]  :  1975      DATE OF OPERATION: 2022    PREOPERATIVE DIAGNOSIS: Morbid Obesity with medical sequelae    POSTOPERATIVE DIAGNOSIS: Morbid Obesity with medical sequelae    OPERATION PERFORMED: 1.  Laparoscopic Sleeve Gastrectomy    SURGEON: Lane De León MD    ASSISTANT:  RODOLFO Hawk PA-C, PA-C    ANESTHESIOLOGIST:  Anesthesiologist: Christian Buchanan M.D.    ANESTHESIA: General endotracheal anesthesia.     SPECIMEN: Stomach    ESTIMATED BLOOD LOSS: <10cc.     INDICATIONS: The patient is a 46 y.o. female with a diagnosis of morbid obesity with medical sequelae. She is taken to the operating room today for Laparoscopic Sleeve Gastrectomy.     PROCEDURE: Following informed consent, the patient was properly identified, taken to the operating room, and placed in the supine position where general endotracheal anesthesia was administered. Intravenous antibiotics were administered by the anesthesiologist in the correct time interval. Sequential compression devices were employed. The abdomen was prepped and draped into a sterile field.     An optical entry bladeless  trocar was utilized and pneumoperitoneum carefully established in the usual fashion.  The bladeless 5 mm separator trocar was introduced and the 5 mm lens/camera was passed into the peritoneal cavity.  Three additional separator trocars were placed under direct vision.  A 5 mm Albertina-type liver retractor was placed into position.  This was used to elevate the left sided segment of the liver.  It was secured to the patients right side with a robot arm.  Careful inspection revealed no untoward events with placement of the trocars.    The gastrocolic omentum was examined and dissected with the ligasure device and a point on the distal antrum was selected to begin the sleeve gastrectomy.  A 40 Egyptian bougie was then passed down into the antrum.  A careful inspection at the hiatus demonstrated no significant hiatal  hernia which would require repair or risk significant reflux. A echelon linear stapler with a thick-tissue cartridges, was employed to divide partway across the stomach.  With the bougie in position, the stapler was then used to march proximally along the stomach and transsection of the stomach was performed, beginning 5 cm proximal to the pylorus in the method of the sleeve gastrectomy.  The greater curvature aspect of the stomach was then dissected and the greater curvature vessels and short gastric vessels were divided with the ligasure.      The last endomechanical stapler firings were used to complete the transection of the stomach.  Hemoclips were used if any site exhibited oozing. Careful inspection of the staple line demonstrated excellent, meticulous hemostasis and a completely intact staple line with seemless tissue approximation.  Seromuscular sutures were placed using polysorb suture to further secure the staple line.  The liver exhibited mild hepatic steatosis.  The bougie was then removed.     The large endocatch bag was then used to retrieve the stomach specimen.  Tisseal fibrin glue sealant was sprayed along the entire staple line. The ports were removed under direct vision and the pneumoperitoneum was allowed to escape. The fascia of this port was closed with 0-Vicryl suture.  The port sites were then irrigated well.  The port site skin incisions were closed with interrupted 4-0 Vicryl subcuticular sutures.  Steri-Strips and Benzoin were applied beneath sterile Band-Aids.     The patient tolerated the procedure well and there were no apparent complications. All sponge, needle, and instrument counts were correct on 2 separate occasions. She was awakened, extubated, and transferred to the recovery room in satisfactory condition.       ____________________________________   Lane De León MD  DD: 1/21/2022  2:30 PM    CC:  [facility];

## 2022-01-22 VITALS
SYSTOLIC BLOOD PRESSURE: 129 MMHG | RESPIRATION RATE: 18 BRPM | HEIGHT: 69 IN | HEART RATE: 71 BPM | DIASTOLIC BLOOD PRESSURE: 86 MMHG | TEMPERATURE: 98.6 F | WEIGHT: 262.13 LBS | BODY MASS INDEX: 38.82 KG/M2 | OXYGEN SATURATION: 93 %

## 2022-01-22 LAB
ALBUMIN SERPL BCP-MCNC: 4.1 G/DL (ref 3.2–4.9)
ALBUMIN/GLOB SERPL: 1.6 G/DL
ALP SERPL-CCNC: 63 U/L (ref 30–99)
ALT SERPL-CCNC: 48 U/L (ref 2–50)
ANION GAP SERPL CALC-SCNC: 12 MMOL/L (ref 7–16)
AST SERPL-CCNC: 61 U/L (ref 12–45)
BILIRUB SERPL-MCNC: 0.5 MG/DL (ref 0.1–1.5)
BUN SERPL-MCNC: 12 MG/DL (ref 8–22)
CALCIUM SERPL-MCNC: 8.6 MG/DL (ref 8.5–10.5)
CHLORIDE SERPL-SCNC: 106 MMOL/L (ref 96–112)
CO2 SERPL-SCNC: 21 MMOL/L (ref 20–33)
CREAT SERPL-MCNC: 0.66 MG/DL (ref 0.5–1.4)
ERYTHROCYTE [DISTWIDTH] IN BLOOD BY AUTOMATED COUNT: 48.4 FL (ref 35.9–50)
GLOBULIN SER CALC-MCNC: 2.6 G/DL (ref 1.9–3.5)
GLUCOSE SERPL-MCNC: 120 MG/DL (ref 65–99)
HCT VFR BLD AUTO: 37.1 % (ref 37–47)
HGB BLD-MCNC: 12.2 G/DL (ref 12–16)
MCH RBC QN AUTO: 32.1 PG (ref 27–33)
MCHC RBC AUTO-ENTMCNC: 32.9 G/DL (ref 33.6–35)
MCV RBC AUTO: 97.6 FL (ref 81.4–97.8)
PLATELET # BLD AUTO: 205 K/UL (ref 164–446)
PMV BLD AUTO: 10.9 FL (ref 9–12.9)
POTASSIUM SERPL-SCNC: 4.5 MMOL/L (ref 3.6–5.5)
PROT SERPL-MCNC: 6.7 G/DL (ref 6–8.2)
RBC # BLD AUTO: 3.8 M/UL (ref 4.2–5.4)
SODIUM SERPL-SCNC: 139 MMOL/L (ref 135–145)
WBC # BLD AUTO: 14.7 K/UL (ref 4.8–10.8)

## 2022-01-22 PROCEDURE — 700111 HCHG RX REV CODE 636 W/ 250 OVERRIDE (IP): Performed by: PHYSICIAN ASSISTANT

## 2022-01-22 PROCEDURE — 80053 COMPREHEN METABOLIC PANEL: CPT

## 2022-01-22 PROCEDURE — 700102 HCHG RX REV CODE 250 W/ 637 OVERRIDE(OP): Performed by: PHYSICIAN ASSISTANT

## 2022-01-22 PROCEDURE — A9270 NON-COVERED ITEM OR SERVICE: HCPCS | Performed by: PHYSICIAN ASSISTANT

## 2022-01-22 PROCEDURE — 700101 HCHG RX REV CODE 250: Performed by: PHYSICIAN ASSISTANT

## 2022-01-22 PROCEDURE — 36415 COLL VENOUS BLD VENIPUNCTURE: CPT

## 2022-01-22 PROCEDURE — 85027 COMPLETE CBC AUTOMATED: CPT

## 2022-01-22 PROCEDURE — 700101 HCHG RX REV CODE 250: Performed by: STUDENT IN AN ORGANIZED HEALTH CARE EDUCATION/TRAINING PROGRAM

## 2022-01-22 RX ORDER — IPRATROPIUM BROMIDE AND ALBUTEROL SULFATE 2.5; .5 MG/3ML; MG/3ML
3 SOLUTION RESPIRATORY (INHALATION)
Status: ACTIVE | OUTPATIENT
Start: 2022-01-22 | End: 2022-01-22

## 2022-01-22 RX ADMIN — GABAPENTIN 300 MG: 300 CAPSULE ORAL at 11:45

## 2022-01-22 RX ADMIN — POTASSIUM CHLORIDE AND SODIUM CHLORIDE: 900; 150 INJECTION, SOLUTION INTRAVENOUS at 08:36

## 2022-01-22 RX ADMIN — ONDANSETRON 4 MG: 2 INJECTION INTRAMUSCULAR; INTRAVENOUS at 10:19

## 2022-01-22 RX ADMIN — OXYCODONE HYDROCHLORIDE 10 MG: 5 SOLUTION ORAL at 08:32

## 2022-01-22 RX ADMIN — ONDANSETRON 4 MG: 2 INJECTION INTRAMUSCULAR; INTRAVENOUS at 05:37

## 2022-01-22 RX ADMIN — OXYCODONE HYDROCHLORIDE 10 MG: 5 SOLUTION ORAL at 05:37

## 2022-01-22 RX ADMIN — POTASSIUM CHLORIDE AND SODIUM CHLORIDE: 900; 150 INJECTION, SOLUTION INTRAVENOUS at 03:08

## 2022-01-22 RX ADMIN — GABAPENTIN 300 MG: 300 CAPSULE ORAL at 05:37

## 2022-01-22 RX ADMIN — ACETAMINOPHEN 1000 MG: 500 TABLET ORAL at 11:45

## 2022-01-22 RX ADMIN — ACETAMINOPHEN 1000 MG: 10 INJECTION, SOLUTION INTRAVENOUS at 05:37

## 2022-01-22 RX ADMIN — ENOXAPARIN SODIUM 40 MG: 40 INJECTION SUBCUTANEOUS at 05:40

## 2022-01-22 RX ADMIN — OXYCODONE HYDROCHLORIDE 10 MG: 5 SOLUTION ORAL at 02:16

## 2022-01-22 RX ADMIN — FAMOTIDINE 20 MG: 10 INJECTION INTRAVENOUS at 05:37

## 2022-01-22 RX ADMIN — DIPHENHYDRAMINE HYDROCHLORIDE 12.5 MG: 50 INJECTION INTRAMUSCULAR; INTRAVENOUS at 03:04

## 2022-01-22 ASSESSMENT — ENCOUNTER SYMPTOMS
VOMITING: 0
NAUSEA: 0
PALPITATIONS: 0
COUGH: 0
ABDOMINAL PAIN: 1
CHILLS: 0
DIARRHEA: 0
SHORTNESS OF BREATH: 0
FEVER: 0
HEARTBURN: 0

## 2022-01-22 ASSESSMENT — COGNITIVE AND FUNCTIONAL STATUS - GENERAL
HELP NEEDED FOR BATHING: A LITTLE
DRESSING REGULAR LOWER BODY CLOTHING: A LITTLE
DAILY ACTIVITIY SCORE: 22
MOBILITY SCORE: 24
SUGGESTED CMS G CODE MODIFIER DAILY ACTIVITY: CJ
SUGGESTED CMS G CODE MODIFIER MOBILITY: CH

## 2022-01-22 ASSESSMENT — PAIN DESCRIPTION - PAIN TYPE: TYPE: ACUTE PAIN;SURGICAL PAIN

## 2022-01-22 NOTE — CARE PLAN
The patient is Stable - Low risk of patient condition declining or worsening    Shift Goals  Clinical Goals: pain management; ambulation  Patient Goals: pain control; discharge home    Progress made toward(s) clinical / shift goals:    Problem: Pain - Standard  Goal: Alleviation of pain or a reduction in pain to the patient’s comfort goal  Outcome: Progressing     Problem: Knowledge Deficit - Standard  Goal: Patient and family/care givers will demonstrate understanding of plan of care, disease process/condition, diagnostic tests and medications  Outcome: Progressing     Medicated per MAR for pain, tolerated well. Discussed daily POC, verbalized understanding.    Patient is not progressing towards the following goals:

## 2022-01-22 NOTE — PROGRESS NOTES
2 RN Skin Check    4 Eyes Skin Assessment Completed by Devika ASKEW RN and Kandi PARRA RN.     Head WDL  Ears WDL  Nose WDL  Mouth WDL  Neck WDL  Breast/Chest WDL  Shoulder Blades WDL  Spine Redness and Blanching  (R) Arm/Elbow/Hand Redness and Blanching  (L) Arm/Elbow/Hand Redness and Blanching  Abdomen Incision, x4 lap sites DIP, CDI  Groin WDL  Scrotum/Coccyx/Buttocks Redness and Blanching  (R) Leg WDL  (L) Leg WDL  (R) Heel/Foot/Toe WDL  (L) Heel/Foot/Toe WDL              Devices In Places Pulse Ox, SCD's and Nasal Cannula        Interventions In Place NC W/Ear Foams, Pillows and Pressure Redistribution Mattress     Possible Skin Injury No     Pictures Uploaded Into Epic N/A  Wound Consult Placed N/A  RN Wound Prevention Protocol Ordered No

## 2022-01-22 NOTE — DISCHARGE INSTRUCTIONS
Discharge Instructions    Discharged to home by car with relative. Discharged via wheelchair, hospital escort: Yes.  Special equipment needed: Not Applicable    Be sure to schedule a follow-up appointment with your primary care doctor or any specialists as instructed.     Discharge Plan:   Influenza Vaccine Indication: Not indicated: Previously immunized this influenza season and > 8 years of age    I understand that a diet low in cholesterol, fat, and sodium is recommended for good health. Unless I have been given specific instructions below for another diet, I accept this instruction as my diet prescription.   Other diet:     Special Instructions:     Bariatric D/C instructions:     1. DIET: Follow the diet progression detailed in your post-op booklet.  Progressing as instructed will make for a smooth transition after surgery and prevent any pain associated with eating foods before your stomach is ready or eating too much.  Water and hydration is much more important than food intake the first week or two after surgery.  Drink enough water to keep your urine pale yellow.  Increase water intake if your urine is a darker yellow or if have burning with urination.  Nausea and vomiting once or twice after you leave the hospital is normal.  Sip fluids continually and stay hydrated.     2.  SUPPLEMENTS: Start your supplements 1 week after surgery.  You may need to cut some supplements in half initially.  Follow the guidelines from your supplement handout from your pre-op class.     3. ACTIVITIES: After discharge from the hospital, you may resume full routine activities. However, there should be no heavy lifting (greater than 15 pounds) and no strenuous activities until after your follow-up visit. Otherwise, routine activities of daily living are acceptable.  More movement and walking after surgery the better.     4. DRIVING: You may drive whenever you are off pain medications and are able to perform the activities needed  to drive, i.e. turning, bending, twisting, etc.     5. BATHING AND WOUND CARE: You may get the wound wet 2 days after surgery. You may shower, but do not submerge in a bath for at least a week. Dressings may come off after 48 hours. Please leave the steri-strips in place, they will fall off over 5-7 days. You may notice some clear or slightly bloody drainage from your wounds and there may be some mild redness or bruising around your incision sites.  This is normal.     6. BOWEL FUNCTION: Constipation is common after an operation, especially with pain medications. The combination of pain medication and decreased activity level can cause constipation in otherwise normal patients. If you feel this is occurring, take a laxative (Milk of Magnesia, Miralax, etc.) until the problem has resolved.  Diarrhea the first few days after surgery can also be normal.  You may notice that it is dark in color or see blood, which is also normal and should subside in the first week post-op.     7. PAIN MEDICATION: You have been given a prescription for pain medication preoperatively.  Please take these as directed. It is important to remember not to take medications on an empty stomach as this may cause nausea.  For minimal discomfort you may use liquid Tylenol 650 mg every 4 hours.  DO NOT exceed 4,000 mg of Tylenol in 24 hours.  DO NOT use non steroidal anti-inflamatory medication such as: Asprin, Ibuprofen, Advil, Motrin, Aleve, or steroids.  These medication can cause ulcers after weight loss surgery.     8.CALL IF YOU HAVE: (1) Fevers to more than 101.5 F, (2) leg pain or swelling (3) Drainage or fluid from incision that may be foul smelling, increased tenderness or soreness at the wound or the wound edges are no longer together, redness or swelling at the incision site. (4) Night Sweats (5) Shaking, Chills (6) Persistent Nausea or Vomiting for over 24 hours.  Use your anti nausea medication as prescribed. (7) Call 911 if sudden  onset of chest pain or shortness of breath that does not improve with 5-10 min of rest.     9. APPOINTMENT: Contact our office at [phone] for a follow-up appointment in 1-2 weeks following your procedure.  Our office hours are Monday-Friday, 8am-5pm.  Please try to call during these hours when we are better able to assist you.     If you have any additional questions, please do not hesitate to call the office and speak to either myself or the physician on call.     Office address:   [facility].   [address] [place] [address] [id number]   [place] [address]    · Is patient discharged on Warfarin / Coumadin?   No     Depression / Suicide Risk    As you are discharged from this [facility] facility, it is important to learn how to keep safe from harming yourself.    Recognize the warning signs:  · Abrupt changes in personality, positive or negative- including increase in energy   · Giving away possessions  · Change in eating patterns- significant weight changes-  positive or negative  · Change in sleeping patterns- unable to sleep or sleeping all the time   · Unwillingness or inability to communicate  · Depression  · Unusual sadness, discouragement and loneliness  · Talk of wanting to die  · Neglect of personal appearance   · Rebelliousness- reckless behavior  · Withdrawal from people/activities they love  · Confusion- inability to concentrate     If you or a loved one observes any of these behaviors or has concerns about self-harm, here's what you can do:  · Talk about it- your feelings and reasons for harming yourself  · Remove any means that you might use to hurt yourself (examples: pills, rope, extension cords, firearm)  · Get professional help from the community (Mental Health, Substance Abuse, psychological counseling)  · Do not be alone:Call your Safe Contact- someone whom you trust who will be there for you.  · Call your local CRISIS HOTLINE 617-5049 or 990-332-6069  · Call your local Children's  Mobile Crisis Response Team [place] (177) 299-1938 or www.[employer].RABBL  · Call the toll free National Suicide Prevention Hotlines   · National Suicide Prevention Lifeline 655-565-ZBYJ (2314)  · National Hope Line Network 800-SUICIDE (887-3691)

## 2022-01-22 NOTE — PROGRESS NOTES
Surgical Progress Note    Author: Bebe Cagle P.A.-C. Date & Time created: 2022   9:42 AM     Interval Events:  46 year old female sp sleeve gastrectomy.  Tolerating clears without nausea/vomiting. Admits to typical incisional abdominal pain, controlled with medication. Pt is ambulating and voiding.   Review of Systems   Constitutional: Negative for chills and fever.   Respiratory: Negative for cough and shortness of breath.    Cardiovascular: Negative for chest pain and palpitations.   Gastrointestinal: Positive for abdominal pain (incisional). Negative for diarrhea, heartburn, nausea and vomiting.   Genitourinary: Negative for dysuria.     Hemodynamics:  Temp (24hrs), Av.3 °C (97.4 °F), Min:35.8 °C (96.5 °F), Max:37 °C (98.6 °F)  Temperature: 37 °C (98.6 °F)  Pulse  Av.2  Min: 52  Max: 80   Blood Pressure: 129/86     Respiratory:    Respiration: 17, Pulse Oximetry: 97 %        RUL Breath Sounds: Clear, RML Breath Sounds: Clear, RLL Breath Sounds: Diminished, VICENTE Breath Sounds: Clear, LLL Breath Sounds: Diminished  Neuro:  GCS       Fluids:    Intake/Output Summary (Last 24 hours) at 2022 0942  Last data filed at 2022 0836  Gross per 24 hour   Intake 2634.17 ml   Output --   Net 2634.17 ml     Weight: 119 kg (262 lb 2 oz)  Current Diet Order   Procedures   • Diet Order Diet: Clear Liquid (Sips with meds and ice chips okay on the day of surgery. ); Miscellaneous modifications: (optional): Bariatric     Physical Exam  Constitutional:       Appearance: Normal appearance. She is obese.   HENT:      Head: Normocephalic and atraumatic.      Nose: Nose normal.      Mouth/Throat:      Mouth: Mucous membranes are moist.   Eyes:      Conjunctiva/sclera: Conjunctivae normal.   Cardiovascular:      Rate and Rhythm: Normal rate and regular rhythm.   Pulmonary:      Effort: Pulmonary effort is normal. No respiratory distress.   Abdominal:      General: There is distension.      Palpations: Abdomen  is soft.      Tenderness: There is abdominal tenderness. There is no guarding or rebound.   Musculoskeletal:         General: Normal range of motion.      Cervical back: Normal range of motion.   Skin:     General: Skin is warm and dry.      Coloration: Skin is not jaundiced.      Findings: No erythema or rash.   Neurological:      Mental Status: She is alert and oriented to person, place, and time.   Psychiatric:         Mood and Affect: Mood normal.       Labs:  Recent Results (from the past 24 hour(s))   Histology Request    Collection Time: 01/21/22  3:09 PM   Result Value Ref Range    Pathology Request Sent to Histo    CBC without Differential (blood)    Collection Time: 01/22/22  6:45 AM   Result Value Ref Range    WBC 14.7 (H) 4.8 - 10.8 K/uL    RBC 3.80 (L) 4.20 - 5.40 M/uL    Hemoglobin 12.2 12.0 - 16.0 g/dL    Hematocrit 37.1 37.0 - 47.0 %    MCV 97.6 81.4 - 97.8 fL    MCH 32.1 27.0 - 33.0 pg    MCHC 32.9 (L) 33.6 - 35.0 g/dL    RDW 48.4 35.9 - 50.0 fL    Platelet Count 205 164 - 446 K/uL    MPV 10.9 9.0 - 12.9 fL   Comp Metabolic Panel (CMP)    Collection Time: 01/22/22  6:45 AM   Result Value Ref Range    Sodium 139 135 - 145 mmol/L    Potassium 4.5 3.6 - 5.5 mmol/L    Chloride 106 96 - 112 mmol/L    Co2 21 20 - 33 mmol/L    Anion Gap 12.0 7.0 - 16.0    Glucose 120 (H) 65 - 99 mg/dL    Bun 12 8 - 22 mg/dL    Creatinine 0.66 0.50 - 1.40 mg/dL    Calcium 8.6 8.5 - 10.5 mg/dL    AST(SGOT) 61 (H) 12 - 45 U/L    ALT(SGPT) 48 2 - 50 U/L    Alkaline Phosphatase 63 30 - 99 U/L    Total Bilirubin 0.5 0.1 - 1.5 mg/dL    Albumin 4.1 3.2 - 4.9 g/dL    Total Protein 6.7 6.0 - 8.2 g/dL    Globulin 2.6 1.9 - 3.5 g/dL    A-G Ratio 1.6 g/dL   ESTIMATED GFR    Collection Time: 01/22/22  6:45 AM   Result Value Ref Range    GFR If African American >60 >60 mL/min/1.73 m 2    GFR If Non African American >60 >60 mL/min/1.73 m 2     Medical Decision Making, by Problem:  There are no active hospital problems to display for this  patient.    Plan:  Pt is alert and oriented, NAD. Breathing unlabored. Tolerating PO.  Incisions ok. VS stable. Labs reviewed.  Leukocytosis, likely reactive. Encouraged ambulation and incentive spirometry.  Wean O2. Pt may need to stay another night for nausea, pain control, hypoxia, will see how the day progresses. Otherwise okay for discharge later this afternoon. Pt also seen and examined by Dr. De León.    Quality Measures:  Quality-Core Measures   Reviewed items::  Labs reviewed  Metzger catheter::  No Metzger  DVT prophylaxis pharmacological::  Enoxaparin (Lovenox)  DVT prophylaxis - mechanical:  SCDs  Ulcer Prophylaxis::  Yes      Discussed patient condition with Patient and Dr. De León

## 2022-01-22 NOTE — PROGRESS NOTES
Report received from MARCELO Segura. Pt arrived to T305 with transport via rney. Ambulated to restroom and to bed.   Skin note complete.

## 2022-01-22 NOTE — PROGRESS NOTES
Patient discharged home with family. All personal belongings collected. IV access removed. Discharge instructions discussed. Medications reviewed. Follow up appointments to be scheduled at earliest convenience. Patient escorted off unit via wheelchair with all personal belongings without incident.

## 2022-01-22 NOTE — ANESTHESIA POSTPROCEDURE EVALUATION
Patient: Rina Roger    Procedure Summary     Date: 01/21/22 Room / Location: [place] [address] / SURGERY [facility]    Anesthesia Start: 1340 Anesthesia Stop: 1439    Procedure: GASTRECTOMY, SLEEVE, LAPAROSCOPIC (N/A Abdomen) Diagnosis: (MORBID OBESITY)    Surgeons: Lane De León M.D. Responsible Provider: Christian Buchanan M.D.    Anesthesia Type: general ASA Status: 2          Final Anesthesia Type: general  Last vitals  BP   Blood Pressure: 102/62    Temp   35.8 °C (96.5 °F)    Pulse   (!) 52   Resp   14    SpO2   97 %      Anesthesia Post Evaluation    Patient location during evaluation: PACU  Patient participation: complete - patient participated  Level of consciousness: awake and alert    Airway patency: patent  Anesthetic complications: no  Cardiovascular status: hemodynamically stable  Respiratory status: acceptable  Hydration status: euvolemic    PONV: none          There were no known complications for this encounter.     Nurse Pain Score: 7 (NPRS)

## 2022-01-22 NOTE — PROGRESS NOTES
Assumed care of patient at bedside report from NOC RN. Updated on POC. Patient currently A & O x 4; on room air; up standby assist, steady on feet with steady gait; with complaints of 8/10 abdominal pain; medicated per MAR. Assessment completed. Call light within reach. Whiteboard updated. Fall precautions in place. Bed locked and in lowest position. All questions answered. No other needs indicated at this time.

## 2022-01-25 LAB — VIT B1 BLD-MCNC: 165 NMOL/L (ref 70–180)

## 2022-01-26 LAB — VIT B6 SERPL-MCNC: 167.6 NMOL/L (ref 20–125)

## 2022-02-22 NOTE — ASSESSMENT & PLAN NOTE
Has been using a  that has helped. Has been to physical therapy in the past. She believes this made her pain worse. Her  occasionally massages her back which helps sometimes. She uses hot packs each evening which alleviate the pain enough so she can get to sleep. She notices that heavy lifting makes this pain worse. She has been through pain management before and does not want to restart opioids. This pain radiates down her left arm. Gabapentin alleviates some of her pain but she does not like the side effects.   
Patient states she is struggling with this due to use of gabapentin as it increases her appetite. She is attempting to reduce her calorie intake. She realizes that reducing her weight will alleviate some of her upper back pain.  
Yes

## 2022-03-01 ENCOUNTER — OFFICE VISIT (OUTPATIENT)
Dept: MEDICAL GROUP | Facility: MEDICAL CENTER | Age: 47
End: 2022-03-01
Attending: FAMILY MEDICINE
Payer: MEDICAID

## 2022-03-01 VITALS
HEART RATE: 78 BPM | TEMPERATURE: 98.9 F | SYSTOLIC BLOOD PRESSURE: 134 MMHG | RESPIRATION RATE: 16 BRPM | WEIGHT: 238.4 LBS | HEIGHT: 69 IN | DIASTOLIC BLOOD PRESSURE: 90 MMHG | OXYGEN SATURATION: 96 % | BODY MASS INDEX: 35.31 KG/M2

## 2022-03-01 DIAGNOSIS — R09.89 CORONA PHLEBECTATICA PARAPLANTARIS: ICD-10-CM

## 2022-03-01 DIAGNOSIS — N95.1 PERIMENOPAUSAL SYMPTOMS: ICD-10-CM

## 2022-03-01 DIAGNOSIS — F33.1 MODERATE EPISODE OF RECURRENT MAJOR DEPRESSIVE DISORDER (HCC): ICD-10-CM

## 2022-03-01 DIAGNOSIS — R06.02 SOB (SHORTNESS OF BREATH): ICD-10-CM

## 2022-03-01 DIAGNOSIS — J45.909 ASTHMA DUE TO SEASONAL ALLERGIES: ICD-10-CM

## 2022-03-01 PROCEDURE — 99213 OFFICE O/P EST LOW 20 MIN: CPT | Performed by: FAMILY MEDICINE

## 2022-03-01 PROCEDURE — 99215 OFFICE O/P EST HI 40 MIN: CPT | Performed by: FAMILY MEDICINE

## 2022-03-01 RX ORDER — PANTOPRAZOLE SODIUM 20 MG/1
20 TABLET, DELAYED RELEASE ORAL
COMMUNITY
Start: 2022-01-19 | End: 2022-08-19 | Stop reason: SDUPTHER

## 2022-03-01 RX ORDER — LEVOCETIRIZINE DIHYDROCHLORIDE 5 MG/1
5 TABLET, FILM COATED ORAL DAILY
Qty: 30 TABLET | Refills: 1 | Status: SHIPPED | OUTPATIENT
Start: 2022-03-01 | End: 2022-03-02 | Stop reason: SDUPTHER

## 2022-03-01 RX ORDER — ESCITALOPRAM OXALATE 5 MG/1
5 TABLET ORAL DAILY
Qty: 30 TABLET | Refills: 1 | Status: SHIPPED | OUTPATIENT
Start: 2022-03-01 | End: 2022-04-06

## 2022-03-01 RX ORDER — PROMETHAZINE HYDROCHLORIDE 12.5 MG/1
TABLET ORAL
COMMUNITY
Start: 2022-01-19 | End: 2022-05-10

## 2022-03-01 ASSESSMENT — FIBROSIS 4 INDEX: FIB4 SCORE: 1.98

## 2022-03-02 DIAGNOSIS — J45.909 ASTHMA DUE TO SEASONAL ALLERGIES: ICD-10-CM

## 2022-03-02 NOTE — TELEPHONE ENCOUNTER
Received request via: Pharmacy    Was the patient seen in the last year in this department? Yes    Does the patient have an active prescription (recently filled or refills available) for medication(s) requested? Yes, patient is requesting a 90 day supply    Last visit 3/1/22

## 2022-03-02 NOTE — ASSESSMENT & PLAN NOTE
Pt reports significant mood fluxuations. Denies hot flashes, night sweats, vulvovaginal atrophy, but has significant mood disturbance. She does report SI, but no plan and reports she wouldn't do it to her family. Her menstrual cycles are getting spread out but are more heavy, longer, and increased clots. She does have significant family history of breast cancer.   Pt has tried the following:  Sertraline   Trazodone - felt intoxicated  wellbutrin  Ativan  cymbalta  amytriptyline  She has had significant drowsiness with these medications

## 2022-03-02 NOTE — ASSESSMENT & PLAN NOTE
Patient is concerned about prominent veins on medial ankles R>L. No bleeding, pain, lower leg edema, but is worried about it getting worse and future complications

## 2022-03-02 NOTE — ASSESSMENT & PLAN NOTE
Pt states that as the weather warms up her symptoms are getting worse and feeling like she.   No inhalers ever helped.   She will make a follow up with pulmonology   Never saw an allergist  She does get a gritty feeling in her eyes.   She has tried claritin, zyrtec, singulair, allegra, without any benefit

## 2022-03-02 NOTE — PROGRESS NOTES
Subjective:     CC:   Chief Complaint   Patient presents with   • Follow-Up     SOB/ hormonal imbalance          HPI:     Perimenopausal symptoms  Pt reports significant mood fluxuations. Denies hot flashes, night sweats, vulvovaginal atrophy, but has significant mood disturbance. She does report SI, but no plan and reports she wouldn't do it to her family. Her menstrual cycles are getting spread out but are more heavy, longer, and increased clots. She does have significant family history of breast cancer.   Pt has tried the following:  Sertraline   Trazodone - felt intoxicated  wellbutrin  Ativan  cymbalta  amytriptyline  She has had significant drowsiness with these medications        SOB (shortness of breath)  Pt states that as the weather warms up her symptoms are getting worse and feeling like she.   No inhalers ever helped.   She will make a follow up with pulmonology   Never saw an allergist  She does get a gritty feeling in her eyes.   She has tried claritin, zyrtec, singulair, allegra, without any benefit    Corona phlebectatica paraplantaris  Patient is concerned about prominent veins on medial ankles R>L. No bleeding, pain, lower leg edema, but is worried about it getting worse and future complications      Past Medical History:   Diagnosis Date   • Allergy     seasonal, meds   • Anemia 01/19/2022    pt takes iron   • Anxiety    • Arrhythmia    • Arthritis 01/19/2022    osteo lower back   • Asthma     as a child   • Back pain 01/19/2022   • Colitis 8/6/2021   • Depression    • Diabetes (HCC)     Prediabetes   • GERD (gastroesophageal reflux disease)    • Glaucoma 01/19/2022    right eye   • Head ache     with her period   • Heart burn    • Hypertension    • Migraine     stopped when she moved out of [place], possibly allergy related   • Pain of right eye 6/17/2016   • Painful breathing    • Pneumonia 03/15/2020   • Shortness of breath    • Sleep apnea 01/19/2022    CPAP   • Thyroid disease    • Wart  "2016       Social History     Tobacco Use   • Smoking status: Former Smoker     Packs/day: 1.00     Years: 35.00     Pack years: 35.00     Types: Cigarettes     Start date:      Quit date:      Years since quittin.1   • Smokeless tobacco: Never Used   Vaping Use   • Vaping Use: Never used   Substance Use Topics   • Alcohol use: No     Alcohol/week: 0.0 oz   • Drug use: Yes     Frequency: 3.0 times per week     Comment: weed for sleep and pain       Current Outpatient Medications Ordered in Epic   Medication Sig Dispense Refill   • enoxaparin (LOVENOX) 40 MG/0.4ML Solution inj INJECT 0.4 ML SUBCUTANEOUS ONCE A DAY FOR 5 DAYS     • HYDROcodone-acetaminophen 2.5-108 mg/5mL (HYCET) 7.5-325 MG/15ML solution 15 ML AS NEEDED ORALLY EVERY 4 HRS 5 DAYS     • pantoprazole (PROTONIX) 20 MG tablet Take 20 mg by mouth every day.     • promethazine (PHENERGAN) 12.5 MG tablet TAKE 1 TABLET BY MOUTH EVERY 6 HOURS FOR 7 DAYS AS NEEDED     • escitalopram (LEXAPRO) 5 MG tablet Take 1 Tablet by mouth every day. 30 Tablet 1   • Levocetirizine Dihydrochloride (XYZAL) 5 MG Tab Take 1 Tablet by mouth every day. 30 Tablet 1   • acetaminophen (TYLENOL) 500 MG Tab Take 1,000 mg by mouth 1 time a day as needed. Indications: Pain     • omeprazole (PRILOSEC) 20 MG delayed-release capsule Take 20 mg by mouth every 3 days.     • therapeutic multivitamin-minerals (THERAGRAN-M) Tab Take 1 Tablet by mouth every day.     • pregabalin (LYRICA) 100 MG Cap Take 1 Capsule by mouth 3 times a day with meals for 90 days. 90 Capsule 2   • methocarbamol (ROBAXIN) 500 MG Tab Take 1 Tablet by mouth 4 times a day as needed. 60 Tablet 5     No current Epic-ordered facility-administered medications on file.       Allergies:  Quinine, Fentanyl, and Tramadol      Objective:       Exam:  /90 (BP Location: Right arm, Patient Position: Sitting, BP Cuff Size: Adult)   Pulse 78   Temp 37.2 °C (98.9 °F) (Temporal)   Resp 16   Ht 1.753 m (5' 9\")  "  Wt 108 kg (238 lb 6.4 oz)   SpO2 96%   BMI 35.21 kg/m²  Body mass index is 35.21 kg/m².    General: Normal appearing. No distress.  Pulmonary: Clear to ausculation.  Normal effort. No rales, ronchi, or wheezing.  Cardiovascular: Regular rate and rhythm without murmur. Radial pulses are intact and equal bilaterally.  Skin: significant intradermal veins noted medial ankle bilaterally, R>L.   Musculoskeletal: Normal gait. No extremity cyanosis, clubbing, or edema.  Psych: Normal mood and affect, intermittently tearful. Alert and oriented x3. Judgment and insight is normal.      Labs:   None recent    Assessment & Plan:     46 y.o. female with the following -     1. Moderate episode of recurrent major depressive disorder (HCC)  - escitalopram (LEXAPRO) 5 MG tablet; Take 1 Tablet by mouth every day.  Dispense: 30 Tablet; Refill: 1  Unclear if patient's mood changes are related to perimenopausal changes vs pre-existing mood disorder (cyclothymia?). She does report that it has been worse over the last year, coinciding with changing menstrual cycles. She has no other perimenopausal symptoms. Pt has struggled to find an antidepressant on which she does not have side effects - will try lexapro and very low dose to see if she can tolerate. Will f/u in 2 months. If we continue not to be able to find an antidepressant, pt would consider being on HRT, however with family history of breast ca she should have genetic screening first and discuss with gynecologist. Referral as below.     2. Perimenopausal symptoms  - escitalopram (LEXAPRO) 5 MG tablet; Take 1 Tablet by mouth every day.  Dispense: 30 Tablet; Refill: 1  - Referral to Gynecology  - FSH/LH; Future  Referral to gyn for possible EMB given heavier and longer bleeding. Also referral for discussing HRT if we cannot find an antidepressant that pt can tolerate. Consider antipsychotic, but pt is unwilling to put herself at further risk of increased weight gain while she is  losing weight after bariatric surgery    3. SOB (shortness of breath)  - Referral to Allergy  Her SOB seems to be seasonally related. She will f/u with pulm again. Will try referral to allergist again for testing.     4. Asthma due to seasonal allergies  - Levocetirizine Dihydrochloride (XYZAL) 5 MG Tab; Take 1 Tablet by mouth every day.  Dispense: 30 Tablet; Refill: 1  Will try xyzal as pt has allergic symptoms that are not resolved by claritinvanessayrtec, allegra, singulair.    5. Corona phlebectatica paraplantaris  - Referral to Vascular Surgery  Pt sent to vascular surgery for evaluation.      Return in about 2 months (around 5/1/2022).     My total time spent caring for the patient on the day of the encounter was 40 minutes.   This does not include time spent on separately billable procedures/tests.      Please note that this dictation was created using voice recognition software. I have made every reasonable attempt to correct obvious errors, but I expect that there are errors of grammar and possibly content that I did not discover before finalizing the note.

## 2022-03-04 RX ORDER — LEVOCETIRIZINE DIHYDROCHLORIDE 5 MG/1
5 TABLET, FILM COATED ORAL DAILY
Qty: 90 TABLET | Refills: 1 | Status: SHIPPED | OUTPATIENT
Start: 2022-03-04 | End: 2022-12-15 | Stop reason: SDUPTHER

## 2022-03-21 DIAGNOSIS — G89.29 CHRONIC MIDLINE LOW BACK PAIN WITH LEFT-SIDED SCIATICA: ICD-10-CM

## 2022-03-21 DIAGNOSIS — M54.42 CHRONIC MIDLINE LOW BACK PAIN WITH LEFT-SIDED SCIATICA: ICD-10-CM

## 2022-03-22 ENCOUNTER — TELEPHONE (OUTPATIENT)
Dept: MEDICAL GROUP | Facility: MEDICAL CENTER | Age: 47
End: 2022-03-22
Payer: MEDICAID

## 2022-03-22 RX ORDER — METHOCARBAMOL 500 MG/1
500 TABLET, FILM COATED ORAL 4 TIMES DAILY PRN
Qty: 60 TABLET | Refills: 0 | Status: SHIPPED | OUTPATIENT
Start: 2022-03-22 | End: 2022-05-01 | Stop reason: SDUPTHER

## 2022-03-22 RX ORDER — PREGABALIN 100 MG/1
100 CAPSULE ORAL
Qty: 90 CAPSULE | Refills: 2 | Status: SHIPPED | OUTPATIENT
Start: 2022-03-22 | End: 2022-05-04 | Stop reason: SDUPTHER

## 2022-03-23 ENCOUNTER — TELEPHONE (OUTPATIENT)
Dept: MEDICAL GROUP | Facility: MEDICAL CENTER | Age: 47
End: 2022-03-23
Payer: MEDICAID

## 2022-03-23 NOTE — TELEPHONE ENCOUNTER
FINAL PRIOR AUTHORIZATION STATUS:    1.  Name of Medication & Dose: PREGABALIN/LYRICA CAP 100MG     2. Prior Auth Status: Approved through 3/22/2023     3. Action Taken: Pharmacy Notified: N\A Patient Notified: N\A    APPROVAL SCANNED INTO MEDIA

## 2022-03-23 NOTE — TELEPHONE ENCOUNTER
MEDICATION PRIOR AUTHORIZATION NEEDED:    1. Name of Medication: pregabalin    2. Requested By (Name of Pharmacy): [facility]       3. Is insurance on file current? Yes     4. What is the name & phone number of the 3rd party payor? ffs     Filed via cover my meds

## 2022-03-25 ENCOUNTER — TELEPHONE (OUTPATIENT)
Dept: MEDICAL GROUP | Facility: MEDICAL CENTER | Age: 47
End: 2022-03-25
Payer: MEDICAID

## 2022-03-25 NOTE — TELEPHONE ENCOUNTER
Phone Number Called: [phone] (home)       Call outcome: Spoke to patient regarding message below.    Message: Spoke with patient's  Chuck. He told me that patient was unable to get her Lyrica. I called the pharmacy to see if there was an issue picking up the med, or if they had not received a notification that the PA we filed had been approved. Medication still was not going through, and was saying it was denied by insurance. Pharmacist was going to contact insurance company to see what was wrong.

## 2022-03-31 ENCOUNTER — HOSPITAL ENCOUNTER (OUTPATIENT)
Dept: LAB | Facility: MEDICAL CENTER | Age: 47
End: 2022-03-31
Attending: NURSE PRACTITIONER
Payer: MEDICAID

## 2022-03-31 LAB
25(OH)D3 SERPL-MCNC: 28 NG/ML (ref 30–100)
ALBUMIN SERPL BCP-MCNC: 4.9 G/DL (ref 3.2–4.9)
ALBUMIN/GLOB SERPL: 2.1 G/DL
ALP SERPL-CCNC: 65 U/L (ref 30–99)
ALT SERPL-CCNC: 14 U/L (ref 2–50)
ANION GAP SERPL CALC-SCNC: 10 MMOL/L (ref 7–16)
AST SERPL-CCNC: 17 U/L (ref 12–45)
BASOPHILS # BLD AUTO: 0.4 % (ref 0–1.8)
BASOPHILS # BLD: 0.03 K/UL (ref 0–0.12)
BILIRUB SERPL-MCNC: 0.4 MG/DL (ref 0.1–1.5)
BUN SERPL-MCNC: 19 MG/DL (ref 8–22)
CALCIUM SERPL-MCNC: 9.5 MG/DL (ref 8.5–10.5)
CHLORIDE SERPL-SCNC: 107 MMOL/L (ref 96–112)
CHOLEST SERPL-MCNC: 165 MG/DL (ref 100–199)
CO2 SERPL-SCNC: 23 MMOL/L (ref 20–33)
CREAT SERPL-MCNC: 0.58 MG/DL (ref 0.5–1.4)
EOSINOPHIL # BLD AUTO: 0.08 K/UL (ref 0–0.51)
EOSINOPHIL NFR BLD: 1.2 % (ref 0–6.9)
ERYTHROCYTE [DISTWIDTH] IN BLOOD BY AUTOMATED COUNT: 50.4 FL (ref 35.9–50)
EST. AVERAGE GLUCOSE BLD GHB EST-MCNC: 97 MG/DL
FERRITIN SERPL-MCNC: 243 NG/ML (ref 10–291)
FOLATE SERPL-MCNC: 32.6 NG/ML
GFR SERPLBLD CREATININE-BSD FMLA CKD-EPI: 113 ML/MIN/1.73 M 2
GLOBULIN SER CALC-MCNC: 2.3 G/DL (ref 1.9–3.5)
GLUCOSE SERPL-MCNC: 97 MG/DL (ref 65–99)
HBA1C MFR BLD: 5 % (ref 4–5.6)
HCT VFR BLD AUTO: 43.8 % (ref 37–47)
HDLC SERPL-MCNC: 36 MG/DL
HGB BLD-MCNC: 13.8 G/DL (ref 12–16)
IMM GRANULOCYTES # BLD AUTO: 0.02 K/UL (ref 0–0.11)
IMM GRANULOCYTES NFR BLD AUTO: 0.3 % (ref 0–0.9)
IRON SERPL-MCNC: 98 UG/DL (ref 40–170)
LDLC SERPL CALC-MCNC: 105 MG/DL
LYMPHOCYTES # BLD AUTO: 1.75 K/UL (ref 1–4.8)
LYMPHOCYTES NFR BLD: 26.1 % (ref 22–41)
MCH RBC QN AUTO: 31.7 PG (ref 27–33)
MCHC RBC AUTO-ENTMCNC: 31.5 G/DL (ref 33.6–35)
MCV RBC AUTO: 100.5 FL (ref 81.4–97.8)
MONOCYTES # BLD AUTO: 0.29 K/UL (ref 0–0.85)
MONOCYTES NFR BLD AUTO: 4.3 % (ref 0–13.4)
NEUTROPHILS # BLD AUTO: 4.53 K/UL (ref 2–7.15)
NEUTROPHILS NFR BLD: 67.7 % (ref 44–72)
NRBC # BLD AUTO: 0 K/UL
NRBC BLD-RTO: 0 /100 WBC
PLATELET # BLD AUTO: 202 K/UL (ref 164–446)
PMV BLD AUTO: 12 FL (ref 9–12.9)
POTASSIUM SERPL-SCNC: 4.6 MMOL/L (ref 3.6–5.5)
PREALB SERPL-MCNC: 22.5 MG/DL (ref 18–38)
PROT SERPL-MCNC: 7.2 G/DL (ref 6–8.2)
RBC # BLD AUTO: 4.36 M/UL (ref 4.2–5.4)
SODIUM SERPL-SCNC: 140 MMOL/L (ref 135–145)
TRANSFERRIN SERPL-MCNC: 201 MG/DL (ref 200–370)
TRIGL SERPL-MCNC: 119 MG/DL (ref 0–149)
TSH SERPL DL<=0.005 MIU/L-ACNC: 1.5 UIU/ML (ref 0.38–5.33)
VIT B12 SERPL-MCNC: 877 PG/ML (ref 211–911)
WBC # BLD AUTO: 6.7 K/UL (ref 4.8–10.8)

## 2022-03-31 PROCEDURE — 80061 LIPID PANEL: CPT

## 2022-03-31 PROCEDURE — 84630 ASSAY OF ZINC: CPT

## 2022-03-31 PROCEDURE — 80053 COMPREHEN METABOLIC PANEL: CPT

## 2022-03-31 PROCEDURE — 82306 VITAMIN D 25 HYDROXY: CPT

## 2022-03-31 PROCEDURE — 84425 ASSAY OF VITAMIN B-1: CPT

## 2022-03-31 PROCEDURE — 84207 ASSAY OF VITAMIN B-6: CPT

## 2022-03-31 PROCEDURE — 84466 ASSAY OF TRANSFERRIN: CPT

## 2022-03-31 PROCEDURE — 84134 ASSAY OF PREALBUMIN: CPT

## 2022-03-31 PROCEDURE — 82607 VITAMIN B-12: CPT

## 2022-03-31 PROCEDURE — 84252 ASSAY OF VITAMIN B-2: CPT

## 2022-03-31 PROCEDURE — 82728 ASSAY OF FERRITIN: CPT

## 2022-03-31 PROCEDURE — 83540 ASSAY OF IRON: CPT

## 2022-03-31 PROCEDURE — 85025 COMPLETE CBC W/AUTO DIFF WBC: CPT

## 2022-03-31 PROCEDURE — 84443 ASSAY THYROID STIM HORMONE: CPT

## 2022-03-31 PROCEDURE — 83036 HEMOGLOBIN GLYCOSYLATED A1C: CPT

## 2022-03-31 PROCEDURE — 36415 COLL VENOUS BLD VENIPUNCTURE: CPT

## 2022-03-31 PROCEDURE — 82746 ASSAY OF FOLIC ACID SERUM: CPT

## 2022-04-03 LAB — ZINC SERPL-MCNC: 119.6 UG/DL (ref 60–120)

## 2022-04-06 DIAGNOSIS — F33.1 MODERATE EPISODE OF RECURRENT MAJOR DEPRESSIVE DISORDER (HCC): ICD-10-CM

## 2022-04-06 DIAGNOSIS — N95.1 PERIMENOPAUSAL SYMPTOMS: ICD-10-CM

## 2022-04-06 LAB
VIT B2 SERPL-SCNC: 14 NMOL/L (ref 5–50)
VIT B6 SERPL-MCNC: 427.1 NMOL/L (ref 20–125)

## 2022-04-06 RX ORDER — VENLAFAXINE HYDROCHLORIDE 37.5 MG/1
37.5 CAPSULE, EXTENDED RELEASE ORAL DAILY
Qty: 30 CAPSULE | Refills: 3 | Status: SHIPPED | OUTPATIENT
Start: 2022-04-06 | End: 2022-05-10

## 2022-04-07 LAB — VIT B1 BLD-MCNC: 128 NMOL/L (ref 70–180)

## 2022-05-04 DIAGNOSIS — M54.42 CHRONIC MIDLINE LOW BACK PAIN WITH LEFT-SIDED SCIATICA: ICD-10-CM

## 2022-05-04 DIAGNOSIS — G89.29 CHRONIC MIDLINE LOW BACK PAIN WITH LEFT-SIDED SCIATICA: ICD-10-CM

## 2022-05-04 RX ORDER — PREGABALIN 100 MG/1
100 CAPSULE ORAL
Qty: 90 CAPSULE | Refills: 0 | Status: SHIPPED | OUTPATIENT
Start: 2022-05-04 | End: 2022-06-03

## 2022-05-04 RX ORDER — PREGABALIN 100 MG/1
100 CAPSULE ORAL
Qty: 90 CAPSULE | Refills: 0 | Status: SHIPPED | OUTPATIENT
Start: 2022-06-04 | End: 2022-07-04

## 2022-05-04 RX ORDER — PREGABALIN 100 MG/1
100 CAPSULE ORAL
Qty: 90 CAPSULE | Refills: 0 | Status: SHIPPED | OUTPATIENT
Start: 2022-07-05 | End: 2022-08-04

## 2022-05-05 ENCOUNTER — TELEPHONE (OUTPATIENT)
Dept: MEDICAL GROUP | Facility: MEDICAL CENTER | Age: 47
End: 2022-05-05
Payer: MEDICAID

## 2022-05-06 ENCOUNTER — TELEPHONE (OUTPATIENT)
Dept: MEDICAL GROUP | Facility: MEDICAL CENTER | Age: 47
End: 2022-05-06
Payer: MEDICAID

## 2022-05-06 NOTE — TELEPHONE ENCOUNTER
DOCUMENTATION OF PAR STATUS:    1. Name of Medication & Dose: Pregabalin     2. Name of Prescription Coverage Company & phone #: Medicaid ffs    3. Date Prior Auth Submitted: 5/5/22    4. What information was given to obtain insurance decision? ICD 10, med history    5. Prior Auth Status? Pending    6. Patient Notified: N\A

## 2022-05-06 NOTE — TELEPHONE ENCOUNTER
FINAL PRIOR AUTHORIZATION STATUS:    1.  Name of Medication & Dose: Lyrica     2. Prior Auth Status: Approved through 3/22/23     3. Action Taken: Pharmacy Notified: yes Patient Notified: N\A    PA filed on 5/5/22 was cancelled as PA was already submitted and approved on 3/22/22. Faxed form to pharmacy

## 2022-05-10 ENCOUNTER — OFFICE VISIT (OUTPATIENT)
Dept: MEDICAL GROUP | Facility: MEDICAL CENTER | Age: 47
End: 2022-05-10
Attending: FAMILY MEDICINE
Payer: MEDICAID

## 2022-05-10 VITALS
BODY MASS INDEX: 32.42 KG/M2 | RESPIRATION RATE: 16 BRPM | DIASTOLIC BLOOD PRESSURE: 74 MMHG | WEIGHT: 218.9 LBS | SYSTOLIC BLOOD PRESSURE: 112 MMHG | HEIGHT: 69 IN | HEART RATE: 71 BPM | TEMPERATURE: 97.6 F | OXYGEN SATURATION: 97 %

## 2022-05-10 DIAGNOSIS — R09.89 CORONA PHLEBECTATICA PARAPLANTARIS: ICD-10-CM

## 2022-05-10 DIAGNOSIS — M72.2 PLANTAR FASCIITIS OF RIGHT FOOT: ICD-10-CM

## 2022-05-10 DIAGNOSIS — N95.1 PERIMENOPAUSAL SYMPTOMS: ICD-10-CM

## 2022-05-10 DIAGNOSIS — G47.39 OTHER SLEEP APNEA: ICD-10-CM

## 2022-05-10 DIAGNOSIS — F33.1 MODERATE EPISODE OF RECURRENT MAJOR DEPRESSIVE DISORDER (HCC): ICD-10-CM

## 2022-05-10 PROBLEM — E66.09 CLASS 1 OBESITY DUE TO EXCESS CALORIES WITHOUT SERIOUS COMORBIDITY WITH BODY MASS INDEX (BMI) OF 32.0 TO 32.9 IN ADULT: Status: ACTIVE | Noted: 2017-05-30

## 2022-05-10 PROBLEM — K21.9 GASTROESOPHAGEAL REFLUX DISEASE WITHOUT ESOPHAGITIS: Status: ACTIVE | Noted: 2022-05-10

## 2022-05-10 PROBLEM — M79.671 RIGHT FOOT PAIN: Status: ACTIVE | Noted: 2022-05-10

## 2022-05-10 PROCEDURE — 99213 OFFICE O/P EST LOW 20 MIN: CPT | Performed by: FAMILY MEDICINE

## 2022-05-10 PROCEDURE — 99214 OFFICE O/P EST MOD 30 MIN: CPT | Performed by: FAMILY MEDICINE

## 2022-05-10 RX ORDER — CHLORAL HYDRATE 500 MG
CAPSULE ORAL
COMMUNITY
End: 2023-04-04

## 2022-05-10 RX ORDER — IRON,CARBONYL/ASCORBIC ACID 100-250 MG
TABLET ORAL
COMMUNITY
End: 2023-04-04

## 2022-05-10 RX ORDER — TIZANIDINE 4 MG/1
TABLET ORAL
COMMUNITY
Start: 2022-03-31

## 2022-05-10 RX ORDER — CALCIUM CARBONATE 260MG(650)
TABLET,CHEWABLE ORAL
COMMUNITY

## 2022-05-10 RX ORDER — VENLAFAXINE HYDROCHLORIDE 75 MG/1
75 CAPSULE, EXTENDED RELEASE ORAL DAILY
Qty: 30 CAPSULE | Refills: 2 | Status: SHIPPED | OUTPATIENT
Start: 2022-05-10 | End: 2022-07-28 | Stop reason: SDUPTHER

## 2022-05-10 RX ORDER — NORETHINDRONE ACETATE AND ETHINYL ESTRADIOL AND FERROUS FUMARATE 1MG-20(24)
KIT ORAL
COMMUNITY
Start: 2022-05-01 | End: 2023-04-04

## 2022-05-10 RX ORDER — PERPHENAZINE 4 MG
TABLET ORAL
COMMUNITY

## 2022-05-10 RX ORDER — METHOCARBAMOL 100 MG/ML
INJECTION, SOLUTION INTRAMUSCULAR; INTRAVENOUS
COMMUNITY
End: 2022-05-10

## 2022-05-10 ASSESSMENT — FIBROSIS 4 INDEX: FIB4 SCORE: 1.03

## 2022-05-10 NOTE — ASSESSMENT & PLAN NOTE
Pt went to see vascular surgery  She is currently wearing compression stockings, plan for vein ablation

## 2022-05-10 NOTE — ASSESSMENT & PLAN NOTE
Patient was started on lexapro for 2 months, requested to switch to effexor for better conrol of perimenopausal symptoms.   With effexor she has had no side effects. Maybe slight improvement in mood. She has been on for 5 weeks. She was also started on low dose birth control   She still reports some intermittent deep depression.

## 2022-05-10 NOTE — PROGRESS NOTES
Subjective:     CC:   Chief Complaint   Patient presents with   • Follow-Up   • Foot Pain         HPI:     Other sleep apnea  Pt reports that she was fighting a lot with the cpap machine and gave up on it 3 weeks ago  Since then she denies nighttime wakings with coughing, no longer snores, daytime sleepiness, and reports feeling refreshed upon waking.       Corona phlebectatica paraplantaris  Pt went to see vascular surgery  She is currently wearing compression stockings, plan for vein ablation     Major depressive disorder  Patient was started on lexapro for 2 months, requested to switch to effexor for better conrol of perimenopausal symptoms.   With effexor she has had no side effects. Maybe slight improvement in mood. She has been on for 5 weeks. She was also started on low dose birth control   She still reports some intermittent deep depression.    Right foot pain  Right foot pain  Worse in the AM, sometimes goes away and will take about 3 hours. Will come back if she remains on her feet.   She has been using kinesio tape which has been helping  Has also been using multiple inserts, including heel cups. These do not help.       Past Medical History:   Diagnosis Date   • Allergy     seasonal, meds   • Anemia 01/19/2022    pt takes iron   • Anxiety    • Arrhythmia    • Arthritis 01/19/2022    osteo lower back   • Asthma     as a child   • Back pain 01/19/2022   • Colitis 8/6/2021   • Depression    • Diabetes (HCC)     Prediabetes   • GERD (gastroesophageal reflux disease)    • Glaucoma 01/19/2022    right eye   • Head ache     with her period   • Heart burn    • Hypertension    • Migraine     stopped when she moved out of [place], possibly allergy related   • Pain of right eye 6/17/2016   • Painful breathing    • Pneumonia 03/15/2020   • Shortness of breath    • Sleep apnea 01/19/2022    CPAP   • Thyroid disease    • Wart 6/17/2016       Social History     Tobacco Use   • Smoking status: Former Smoker      Packs/day: 1.00     Years: 35.00     Pack years: 35.00     Types: Cigarettes     Start date:      Quit date:      Years since quittin.3   • Smokeless tobacco: Never Used   Vaping Use   • Vaping Use: Never used   Substance Use Topics   • Alcohol use: No     Alcohol/week: 0.0 oz   • Drug use: Yes     Frequency: 3.0 times per week     Comment: weed for sleep and pain       Current Outpatient Medications Ordered in Epic   Medication Sig Dispense Refill   • Collagen-Vitamin C-Biotin (COLLAGEN 1500/C) 500-50-0.8 MG Cap      • Zinc 10 MG Lozenge      • Iron-Vitamin C 100-250 MG Tab      • Flaxseed, Linseed, (FLAX SEEDS PO)      • tizanidine (ZANAFLEX) 4 MG Tab TAKE 1 TABLET EVERY 8 HOURS AS NEEDED FOR SPASMS MAY CAUSE SEDATION     • AUROVELA 24 FE 1-20 MG-MCG(24) Tab TAKE 1 TABLET BY MOUTH DAILY. START MEDICATION ON GALILEA 4/3/2022     • Omega-3 Fatty Acids (FISH OIL) 1000 MG Cap capsule      • venlafaxine XR (EFFEXOR XR) 75 MG CAPSULE SR 24 HR Take 1 Capsule by mouth every day. 30 Capsule 2   • pregabalin (LYRICA) 100 MG Cap Take 1 Capsule by mouth 3 times a day with meals for 30 days. 90 Capsule 0   • [START ON 2022] pregabalin (LYRICA) 100 MG Cap Take 1 Capsule by mouth 3 times a day with meals for 30 days. 90 Capsule 0   • [START ON 2022] pregabalin (LYRICA) 100 MG Cap Take 1 Capsule by mouth 3 times a day with meals for 30 days. 90 Capsule 0   • methocarbamol (ROBAXIN) 500 MG Tab Take 1 Tablet by mouth 4 times a day as needed (pain). 60 Tablet 1   • Levocetirizine Dihydrochloride (XYZAL) 5 MG Tab Take 1 Tablet by mouth every day. 90 Tablet 1   • pantoprazole (PROTONIX) 20 MG tablet Take 20 mg by mouth every day.     • acetaminophen (TYLENOL) 500 MG Tab Take 1,000 mg by mouth 1 time a day as needed. Indications: Pain     • therapeutic multivitamin-minerals (THERAGRAN-M) Tab Take 1 Tablet by mouth every day.       No current [facility]-ordered facility-administered medications on file.  "      Allergies:  Quinine, Fentanyl, and Tramadol        Objective:       Exam:  /74 (BP Location: Left arm, Patient Position: Sitting, BP Cuff Size: Adult)   Pulse 71   Temp 36.4 °C (97.6 °F) (Temporal)   Resp 16   Ht 1.753 m (5' 9\")   Wt 99.3 kg (218 lb 14.4 oz)   SpO2 97%   BMI 32.33 kg/m²  Body mass index is 32.33 kg/m².    Constitutional: Alert, no distress, well-groomed.  Respiratory: Unlabored respiratory effort, no cough.  MSK:   Right foot-  Most pain is at the insertion of the plantar fascia on the calcaneus.  Also some soreness around the lateral and medial.  Psych: Alert and oriented x3, normal affect and mood.              Labs:   Reviewed labs done in March, normal.    Assessment & Plan:     46 y.o. female with the following -     1. Moderate episode of recurrent major depressive disorder (HCC)  - venlafaxine XR (EFFEXOR XR) 75 MG CAPSULE SR 24 HR; Take 1 Capsule by mouth every day.  Dispense: 30 Capsule; Refill: 2  Increasing Effexor to 75 mg for depression/perimenopausal symptoms.  So far she is doing well on this, with mild improvement in mood, no side effects.    2. Perimenopausal symptoms  - venlafaxine XR (EFFEXOR XR) 75 MG CAPSULE SR 24 HR; Take 1 Capsule by mouth every day.  Dispense: 30 Capsule; Refill: 2    3.  Plantar fasciitis of the right foot  Patient is plantar fasciitis of the right foot as demonstrated by physical exam.  Advised her to ice the area daily, use tennis ball/golf ball to massage the area nightly as well, exercises were given to her.  She was counseled on typical course.  I also suggested Voltaren gel, however I know that NSAIDs are limited for her since she recently had gastric surgery, I have asked her to review this medication with her bariatric surgeon.    4. Other sleep apnea  Based on her symptomology, it certainly sounds like her sleep apnea has resolved.  We will repeat a sleep study in the future.    5. Corona phlebectatica paraplantaris  Likely has " vein ablation planned with vascular surgery.    Return in about 3 months (around 8/10/2022).    Please note that this dictation was created using voice recognition software. I have made every reasonable attempt to correct obvious errors, but I expect that there are errors of grammar and possibly content that I did not discover before finalizing the note.

## 2022-05-10 NOTE — LETTER
[facility]  Odilia Engel M.D.  [address]  [place] [address]  Fax: [phone]   Authorization for Release/Disclosure of   Protected Health Information   Name: RINA ODELLTESS : 1975 SSN: [id number]   Address: [address] [place] [address] Phone:    [phone] (home)    I authorize the entity listed below to release/disclose the PHI below to:   [facility]/Odilia Engel M.D. and Odilia Engel M.D.   Provider or Entity Name[place]  [facility] [place]   Address   City, [facility], [facility]   Phone:      Fax:     Reason for request: continuity of care   Information to be released:    [  ] LAST COLONOSCOPY,  including any PATH REPORT and follow-up  [  ] LAST FIT/COLOGUARD RESULT [  ] LAST DEXA  [X  ] LAST MAMMOGRAM  [ X ] LAST PAP  [  ] LAST LABS [  ] RETINA EXAM REPORT  [  ] IMMUNIZATION RECORDS  [ X Release all info      [  ] Check here and initial the line next to each item to release ALL health information INCLUDING  _____ Care and treatment for drug and / or alcohol abuse  _____ HIV testing, infection status, or AIDS  _____ Genetic Testing    DATES OF SERVICE OR TIME PERIOD TO BE DISCLOSED: _____________  I understand and acknowledge that:  * This Authorization may be revoked at any time by you in writing, except if your health information has already been used or disclosed.  * Your health information that will be used or disclosed as a result of you signing this authorization could be re-disclosed by the recipient. If this occurs, your re-disclosed health information may no longer be protected by State or Federal laws.  * You may refuse to sign this Authorization. Your refusal will not affect your ability to obtain treatment.  * This Authorization becomes effective upon signing and will  on (date) __________.      If no date is indicated, this Authorization will  one (1) year from the signature date.    Name: Rina Odelltess    Signature:   Date:     5/10/2022        PLEASE FAX REQUESTED RECORDS BACK TO: [phone]

## 2022-05-10 NOTE — PATIENT INSTRUCTIONS
Frozen water bottle  Golf ball     Plantar Fasciitis Rehab  Ask your health care provider which exercises are safe for you. Do exercises exactly as told by your health care provider and adjust them as directed. It is normal to feel mild stretching, pulling, tightness, or discomfort as you do these exercises. Stop right away if you feel sudden pain or your pain gets worse. Do not begin these exercises until told by your health care provider.  Stretching and range-of-motion exercises  These exercises warm up your muscles and joints and improve the movement and flexibility of your foot. These exercises also help to relieve pain.  Plantar fascia stretch    1. Sit with your left / right leg crossed over your opposite knee.  2. Hold your heel with one hand with that thumb near your arch. With your other hand, hold your toes and gently pull them back toward the top of your foot. You should feel a stretch on the bottom of your toes or your foot (plantar fascia) or both.  3. Hold this stretch for__________ seconds.  4. Slowly release your toes and return to the starting position.  Repeat __________ times. Complete this exercise __________ times a day.  Gastrocnemius stretch, standing  This exercise is also called a calf (gastroc) stretch. It stretches the muscles in the back of the upper calf.  1. Stand with your hands against a wall.  2. Extend your left / right leg behind you, and bend your front knee slightly.  3. Keeping your heels on the floor and your back knee straight, shift your weight toward the wall. Do not arch your back. You should feel a gentle stretch in your upper left / right calf.  4. Hold this position for __________ seconds.  Repeat __________ times. Complete this exercise __________ times a day.  Soleus stretch, standing  This exercise is also called a calf (soleus) stretch. It stretches the muscles in the back of the lower calf.  1. Stand with your hands against a wall.  2. Extend your left / right leg  behind you, and bend your front knee slightly.  3. Keeping your heels on the floor, bend your back knee and shift your weight slightly over your back leg. You should feel a gentle stretch deep in your lower calf.  4. Hold this position for __________ seconds.  Repeat __________ times. Complete this exercise __________ times a day.  Gastroc and soleus stretch, standing step  This exercise stretches the muscles in the back of the lower leg. These muscles are in the upper calf (gastrocnemius) and the lower calf (soleus).  1. Stand with the ball of your left / right foot on a step. The ball of your foot is on the walking surface, right under your toes.  2. Keep your other foot firmly on the same step.  3. Hold on to the wall or a railing for balance.  4. Slowly lift your other foot, allowing your body weight to press your left / right heel down over the edge of the step. You should feel a stretch in your left / right calf.  5. Hold this position for __________ seconds.  6. Return both feet to the step.  7. Repeat this exercise with a slight bend in your left / right knee.  Repeat __________ times with your left / right knee straight and __________ times with your left / right knee bent. Complete this exercise __________ times a day.  Balance exercise  This exercise builds your balance and strength control of your arch to help take pressure off your plantar fascia.  Single leg stand  If this exercise is too easy, you can try it with your eyes closed or while standing on a pillow.  1. Without shoes, stand near a railing or in a doorway. You may hold on to the railing or door frame as needed.  2. Stand on your left / right foot. Keep your big toe down on the floor and try to keep your arch lifted. Do not let your foot roll inward.  3. Hold this position for __________ seconds.  Repeat __________ times. Complete this exercise __________ times a day.  This information is not intended to replace advice given to you by your  health care provider. Make sure you discuss any questions you have with your health care provider.  Document Released: 12/18/2006 Document Revised: 04/09/2020 Document Reviewed: 10/16/2019  Elsevier Patient Education © 2020 [employer]

## 2022-05-10 NOTE — ASSESSMENT & PLAN NOTE
Pt reports that she was fighting a lot with the cpap machine and gave up on it 3 weeks ago  Since then she denies nighttime wakings with coughing, no longer snores, daytime sleepiness, and reports feeling refreshed upon waking.

## 2022-05-10 NOTE — ASSESSMENT & PLAN NOTE
Right foot pain  Worse in the AM, sometimes goes away and will take about 3 hours. Will come back if she remains on her feet.   She has been using kinesio tape which has been helping  Has also been using multiple inserts, including heel cups. These do not help.

## 2022-05-25 ENCOUNTER — HOSPITAL ENCOUNTER (OUTPATIENT)
Dept: LAB | Facility: MEDICAL CENTER | Age: 47
End: 2022-05-25
Attending: PHYSICIAN ASSISTANT
Payer: MEDICAID

## 2022-05-25 LAB
ALBUMIN SERPL BCP-MCNC: 4.3 G/DL (ref 3.2–4.9)
ALBUMIN/GLOB SERPL: 1.4 G/DL
ALP SERPL-CCNC: 63 U/L (ref 30–99)
ALT SERPL-CCNC: 17 U/L (ref 2–50)
ANION GAP SERPL CALC-SCNC: 10 MMOL/L (ref 7–16)
AST SERPL-CCNC: 22 U/L (ref 12–45)
BASOPHILS # BLD AUTO: 0.6 % (ref 0–1.8)
BASOPHILS # BLD: 0.04 K/UL (ref 0–0.12)
BILIRUB SERPL-MCNC: 0.6 MG/DL (ref 0.1–1.5)
BUN SERPL-MCNC: 20 MG/DL (ref 8–22)
CALCIUM SERPL-MCNC: 9.2 MG/DL (ref 8.5–10.5)
CHLORIDE SERPL-SCNC: 107 MMOL/L (ref 96–112)
CHOLEST SERPL-MCNC: 189 MG/DL (ref 100–199)
CO2 SERPL-SCNC: 24 MMOL/L (ref 20–33)
CREAT SERPL-MCNC: 0.63 MG/DL (ref 0.5–1.4)
EOSINOPHIL # BLD AUTO: 0.12 K/UL (ref 0–0.51)
EOSINOPHIL NFR BLD: 1.9 % (ref 0–6.9)
ERYTHROCYTE [DISTWIDTH] IN BLOOD BY AUTOMATED COUNT: 49.6 FL (ref 35.9–50)
EST. AVERAGE GLUCOSE BLD GHB EST-MCNC: 85 MG/DL
FASTING STATUS PATIENT QL REPORTED: NORMAL
GFR SERPLBLD CREATININE-BSD FMLA CKD-EPI: 110 ML/MIN/1.73 M 2
GLOBULIN SER CALC-MCNC: 3 G/DL (ref 1.9–3.5)
GLUCOSE SERPL-MCNC: 82 MG/DL (ref 65–99)
HBA1C MFR BLD: 4.6 % (ref 4–5.6)
HCT VFR BLD AUTO: 41 % (ref 37–47)
HDLC SERPL-MCNC: 37 MG/DL
HGB BLD-MCNC: 13.2 G/DL (ref 12–16)
IMM GRANULOCYTES # BLD AUTO: 0.02 K/UL (ref 0–0.11)
IMM GRANULOCYTES NFR BLD AUTO: 0.3 % (ref 0–0.9)
LDLC SERPL CALC-MCNC: 120 MG/DL
LYMPHOCYTES # BLD AUTO: 1.48 K/UL (ref 1–4.8)
LYMPHOCYTES NFR BLD: 23.6 % (ref 22–41)
MCH RBC QN AUTO: 32.5 PG (ref 27–33)
MCHC RBC AUTO-ENTMCNC: 32.2 G/DL (ref 33.6–35)
MCV RBC AUTO: 101 FL (ref 81.4–97.8)
MONOCYTES # BLD AUTO: 0.34 K/UL (ref 0–0.85)
MONOCYTES NFR BLD AUTO: 5.4 % (ref 0–13.4)
NEUTROPHILS # BLD AUTO: 4.27 K/UL (ref 2–7.15)
NEUTROPHILS NFR BLD: 68.2 % (ref 44–72)
NRBC # BLD AUTO: 0 K/UL
NRBC BLD-RTO: 0 /100 WBC
PLATELET # BLD AUTO: 209 K/UL (ref 164–446)
PMV BLD AUTO: 11.7 FL (ref 9–12.9)
POTASSIUM SERPL-SCNC: 4.6 MMOL/L (ref 3.6–5.5)
PROT SERPL-MCNC: 7.3 G/DL (ref 6–8.2)
RBC # BLD AUTO: 4.06 M/UL (ref 4.2–5.4)
SODIUM SERPL-SCNC: 141 MMOL/L (ref 135–145)
TRANSFERRIN SERPL-MCNC: 212 MG/DL (ref 200–370)
TRIGL SERPL-MCNC: 161 MG/DL (ref 0–149)
WBC # BLD AUTO: 6.3 K/UL (ref 4.8–10.8)

## 2022-05-25 PROCEDURE — 84134 ASSAY OF PREALBUMIN: CPT

## 2022-05-25 PROCEDURE — 82746 ASSAY OF FOLIC ACID SERUM: CPT

## 2022-05-25 PROCEDURE — 80061 LIPID PANEL: CPT

## 2022-05-25 PROCEDURE — 85025 COMPLETE CBC W/AUTO DIFF WBC: CPT

## 2022-05-25 PROCEDURE — 84207 ASSAY OF VITAMIN B-6: CPT

## 2022-05-25 PROCEDURE — 82607 VITAMIN B-12: CPT

## 2022-05-25 PROCEDURE — 82728 ASSAY OF FERRITIN: CPT

## 2022-05-25 PROCEDURE — 36415 COLL VENOUS BLD VENIPUNCTURE: CPT

## 2022-05-25 PROCEDURE — 84425 ASSAY OF VITAMIN B-1: CPT

## 2022-05-25 PROCEDURE — 84630 ASSAY OF ZINC: CPT

## 2022-05-25 PROCEDURE — 80053 COMPREHEN METABOLIC PANEL: CPT

## 2022-05-25 PROCEDURE — 84252 ASSAY OF VITAMIN B-2: CPT

## 2022-05-25 PROCEDURE — 84466 ASSAY OF TRANSFERRIN: CPT

## 2022-05-25 PROCEDURE — 84443 ASSAY THYROID STIM HORMONE: CPT

## 2022-05-25 PROCEDURE — 82306 VITAMIN D 25 HYDROXY: CPT

## 2022-05-25 PROCEDURE — 83036 HEMOGLOBIN GLYCOSYLATED A1C: CPT

## 2022-05-26 LAB
FERRITIN SERPL-MCNC: 257 NG/ML (ref 10–291)
FOLATE SERPL-MCNC: >40 NG/ML
PREALB SERPL-MCNC: 26.5 MG/DL (ref 18–38)
TSH SERPL DL<=0.005 MIU/L-ACNC: 1.43 UIU/ML (ref 0.38–5.33)
VIT B12 SERPL-MCNC: 556 PG/ML (ref 211–911)

## 2022-05-27 LAB — 25(OH)D3 SERPL-MCNC: 35 NG/ML (ref 30–80)

## 2022-05-28 LAB — ZINC SERPL-MCNC: 108.3 UG/DL (ref 60–120)

## 2022-05-29 LAB — VIT B2 SERPL-SCNC: 5 NMOL/L (ref 5–50)

## 2022-05-30 LAB — VIT B6 SERPL-MCNC: 142.3 NMOL/L (ref 20–125)

## 2022-06-02 DIAGNOSIS — M54.42 CHRONIC MIDLINE LOW BACK PAIN WITH LEFT-SIDED SCIATICA: ICD-10-CM

## 2022-06-02 DIAGNOSIS — G89.29 CHRONIC MIDLINE LOW BACK PAIN WITH LEFT-SIDED SCIATICA: ICD-10-CM

## 2022-06-02 NOTE — TELEPHONE ENCOUNTER
Received request via: Pharmacy    Was the patient seen in the last year in this department? Yes    Does the patient have an active prescription (recently filled or refills available) for medication(s) requested? Yes, pharmacy requested early refill

## 2022-06-03 LAB — VIT B1 BLD-MCNC: 82 NMOL/L (ref 70–180)

## 2022-06-03 RX ORDER — METHOCARBAMOL 500 MG/1
500 TABLET, FILM COATED ORAL 4 TIMES DAILY PRN
Qty: 60 TABLET | Refills: 1 | Status: SHIPPED | OUTPATIENT
Start: 2022-06-03 | End: 2022-10-14 | Stop reason: SDUPTHER

## 2022-06-16 ENCOUNTER — HOSPITAL ENCOUNTER (OUTPATIENT)
Dept: RADIOLOGY | Facility: MEDICAL CENTER | Age: 47
End: 2022-06-16
Attending: NURSE PRACTITIONER
Payer: MEDICAID

## 2022-06-16 DIAGNOSIS — M62.81 MUSCLE WEAKNESS (GENERALIZED): ICD-10-CM

## 2022-06-28 DIAGNOSIS — G89.29 CHRONIC MIDLINE LOW BACK PAIN WITH LEFT-SIDED SCIATICA: ICD-10-CM

## 2022-06-28 DIAGNOSIS — M54.42 CHRONIC MIDLINE LOW BACK PAIN WITH LEFT-SIDED SCIATICA: ICD-10-CM

## 2022-07-28 DIAGNOSIS — F33.1 MODERATE EPISODE OF RECURRENT MAJOR DEPRESSIVE DISORDER (HCC): ICD-10-CM

## 2022-07-28 DIAGNOSIS — N95.1 PERIMENOPAUSAL SYMPTOMS: ICD-10-CM

## 2022-07-28 NOTE — TELEPHONE ENCOUNTER
Received request via: Pharmacy    Was the patient seen in the last year in this department? Yes    Does the patient have an active prescription (recently filled or refills available) for medication(s) requested? yes     Pharmacy requested advanced refill to avoid any missed doses

## 2022-07-29 ENCOUNTER — TELEPHONE (OUTPATIENT)
Dept: MEDICAL GROUP | Facility: MEDICAL CENTER | Age: 47
End: 2022-07-29
Payer: MEDICAID

## 2022-07-29 RX ORDER — VENLAFAXINE HYDROCHLORIDE 75 MG/1
75 CAPSULE, EXTENDED RELEASE ORAL DAILY
Qty: 90 CAPSULE | Refills: 1 | Status: SHIPPED | OUTPATIENT
Start: 2022-07-29 | End: 2022-08-19 | Stop reason: SDUPTHER

## 2022-08-19 ENCOUNTER — OFFICE VISIT (OUTPATIENT)
Dept: MEDICAL GROUP | Facility: MEDICAL CENTER | Age: 47
End: 2022-08-19
Attending: FAMILY MEDICINE
Payer: MEDICAID

## 2022-08-19 VITALS
BODY MASS INDEX: 28.8 KG/M2 | HEART RATE: 63 BPM | RESPIRATION RATE: 12 BRPM | TEMPERATURE: 96.7 F | WEIGHT: 195 LBS | SYSTOLIC BLOOD PRESSURE: 110 MMHG | DIASTOLIC BLOOD PRESSURE: 82 MMHG | OXYGEN SATURATION: 99 %

## 2022-08-19 DIAGNOSIS — M54.42 CHRONIC MIDLINE LOW BACK PAIN WITH BILATERAL SCIATICA: ICD-10-CM

## 2022-08-19 DIAGNOSIS — M54.41 CHRONIC MIDLINE LOW BACK PAIN WITH BILATERAL SCIATICA: ICD-10-CM

## 2022-08-19 DIAGNOSIS — K21.9 GASTROESOPHAGEAL REFLUX DISEASE WITHOUT ESOPHAGITIS: ICD-10-CM

## 2022-08-19 DIAGNOSIS — G89.29 CHRONIC MIDLINE LOW BACK PAIN WITH BILATERAL SCIATICA: ICD-10-CM

## 2022-08-19 DIAGNOSIS — F33.1 MODERATE EPISODE OF RECURRENT MAJOR DEPRESSIVE DISORDER (HCC): ICD-10-CM

## 2022-08-19 DIAGNOSIS — N95.1 PERIMENOPAUSAL SYMPTOMS: ICD-10-CM

## 2022-08-19 PROCEDURE — 99214 OFFICE O/P EST MOD 30 MIN: CPT | Performed by: FAMILY MEDICINE

## 2022-08-19 RX ORDER — PREGABALIN 150 MG/1
150 CAPSULE ORAL DAILY
Qty: 270 CAPSULE | Refills: 0 | Status: SHIPPED | OUTPATIENT
Start: 2022-08-19 | End: 2022-09-20 | Stop reason: SDUPTHER

## 2022-08-19 RX ORDER — PANTOPRAZOLE SODIUM 20 MG/1
20 TABLET, DELAYED RELEASE ORAL DAILY
Qty: 90 TABLET | Refills: 0 | Status: SHIPPED | OUTPATIENT
Start: 2022-08-19 | End: 2023-01-10

## 2022-08-19 RX ORDER — VENLAFAXINE HYDROCHLORIDE 37.5 MG/1
75 CAPSULE, EXTENDED RELEASE ORAL DAILY
Qty: 90 CAPSULE | Refills: 1 | Status: SHIPPED | OUTPATIENT
Start: 2022-08-19 | End: 2022-09-02 | Stop reason: SDUPTHER

## 2022-08-19 RX ORDER — PANTOPRAZOLE SODIUM 20 MG/1
20 TABLET, DELAYED RELEASE ORAL
Qty: 90 TABLET | Refills: 1 | Status: SHIPPED | OUTPATIENT
Start: 2022-08-19 | End: 2023-01-07 | Stop reason: SDUPTHER

## 2022-08-19 ASSESSMENT — PATIENT HEALTH QUESTIONNAIRE - PHQ9
2. FEELING DOWN, DEPRESSED, IRRITABLE, OR HOPELESS: NOT AT ALL
SUM OF ALL RESPONSES TO PHQ9 QUESTIONS 1 AND 2: 0
5. POOR APPETITE OR OVEREATING: NOT AT ALL
3. TROUBLE FALLING OR STAYING ASLEEP OR SLEEPING TOO MUCH: NOT AT ALL
SUM OF ALL RESPONSES TO PHQ QUESTIONS 1-9: 0
7. TROUBLE CONCENTRATING ON THINGS, SUCH AS READING THE NEWSPAPER OR WATCHING TELEVISION: NOT AT ALL
9. THOUGHTS THAT YOU WOULD BE BETTER OFF DEAD, OR OF HURTING YOURSELF: NOT AT ALL
4. FEELING TIRED OR HAVING LITTLE ENERGY: NOT AT ALL
8. MOVING OR SPEAKING SO SLOWLY THAT OTHER PEOPLE COULD HAVE NOTICED. OR THE OPPOSITE, BEING SO FIGETY OR RESTLESS THAT YOU HAVE BEEN MOVING AROUND A LOT MORE THAN USUAL: NOT AT ALL
6. FEELING BAD ABOUT YOURSELF - OR THAT YOU ARE A FAILURE OR HAVE LET YOURSELF OR YOUR FAMILY DOWN: NOT AL ALL
1. LITTLE INTEREST OR PLEASURE IN DOING THINGS: NOT AT ALL

## 2022-08-19 ASSESSMENT — FIBROSIS 4 INDEX: FIB4 SCORE: 1.174383026491717556

## 2022-08-19 NOTE — PROGRESS NOTES
Subjective:     CC:   Chief Complaint   Patient presents with    Medication Management    Heartburn         HPI:     Gastroesophageal reflux disease without esophagitis  Pt has been doing very well on pantoprazole and would like refill, hasn't been able to reach Dr. De León's office for prescriptions    Chronic midline low back pain with bilateral sciatica  Patient states lyrica is helping significantly with neuropathic pain on the left leg, but still gets some discomfort on the left leg at nighttime. Would like to increase lyrica    Major depressive disorder  Patient has been on Effexor 75 mg, however she states that gives her significant mount of drowsiness.  She was doing okay on Effexor 37.5 mg and would like to switch back to it.  She states that it was helping her mood, however still has significant mood swings during the week of her period.  She is currently on OCPs as prescribed by her gynecologist.  She recently had a follow-up with them and they suggested that she continue it.    Past Medical History:   Diagnosis Date    Allergy     seasonal, meds    Anemia 2022    pt takes iron    Anxiety     Arrhythmia     Arthritis 2022    osteo lower back    Asthma     as a child    Back pain 2022    Colitis 2021    Depression     Diabetes (HCC)     Prediabetes    GERD (gastroesophageal reflux disease)     Glaucoma 2022    right eye    Head ache     with her period    Heart burn     Hypertension     Migraine     stopped when she moved out of [place], possibly allergy related    Pain of right eye 2016    Painful breathing     Pneumonia 03/15/2020    Shortness of breath     Sleep apnea 2022    CPAP    Thyroid disease     Wart 2016       Social History     Tobacco Use    Smoking status: Former     Packs/day: 1.00     Years: 35.00     Pack years: 35.00     Types: Cigarettes     Start date:      Quit date:      Years since quittin.6    Smokeless tobacco: Never    Vaping Use    Vaping Use: Never used   Substance Use Topics    Alcohol use: No     Alcohol/week: 0.0 oz    Drug use: Yes     Frequency: 3.0 times per week     Comment: weed for sleep and pain       Current Outpatient Medications Ordered in Epic   Medication Sig Dispense Refill    pantoprazole (PROTONIX) 20 MG tablet Take 1 Tablet by mouth every day. 90 Tablet 1    pantoprazole (PROTONIX) 20 MG tablet Take 1 Tablet by mouth every day. 90 Tablet 0    pregabalin (LYRICA) 150 MG Cap Take 1 Capsule by mouth every day for 90 days. 270 Capsule 0    venlafaxine XR (EFFEXOR XR) 37.5 MG CAPSULE SR 24 HR Take 2 Capsules by mouth every day. 90 Capsule 1    methocarbamol (ROBAXIN) 500 MG Tab Take 1 Tablet by mouth 4 times a day as needed (pain). 60 Tablet 1    Collagen-Vitamin C-Biotin (COLLAGEN 1500/C) 500-50-0.8 MG Cap       Zinc 10 MG Lozenge       Iron-Vitamin C 100-250 MG Tab       tizanidine (ZANAFLEX) 4 MG Tab TAKE 1 TABLET EVERY 8 HOURS AS NEEDED FOR SPASMS MAY CAUSE SEDATION      AUROVELA 24 FE 1-20 MG-MCG(24) Tab TAKE 1 TABLET BY MOUTH DAILY. START MEDICATION ON GALILEA 4/3/2022      Omega-3 Fatty Acids (FISH OIL) 1000 MG Cap capsule       Levocetirizine Dihydrochloride (XYZAL) 5 MG Tab Take 1 Tablet by mouth every day. 90 Tablet 1    acetaminophen (TYLENOL) 500 MG Tab Take 1,000 mg by mouth 1 time a day as needed. Indications: Pain      therapeutic multivitamin-minerals (THERAGRAN-M) Tab Take 1 Tablet by mouth every day.       No current [facility]-ordered facility-administered medications on file.       Allergies:  Quinine, Fentanyl, and Tramadol        Objective:       Exam:  /82 (BP Location: Left arm, Patient Position: Sitting, BP Cuff Size: Adult)   Pulse 63   Temp 35.9 °C (96.7 °F) (Temporal)   Resp 12   Wt 88.5 kg (195 lb)   SpO2 99%   BMI 28.80 kg/m²  Body mass index is 28.8 kg/m².    Constitutional: Alert, no distress, well-groomed.  Respiratory: Unlabored respiratory effort, no cough.  MSK: moves all  extremities.  Psych: Alert and oriented x3, normal affect and mood.            Labs:   Labs 7/8/2022 showing normal BMP, mildly elevated glucose but nonfasting status    Assessment & Plan:     46 y.o. female with the following -     1. Gastroesophageal reflux disease without esophagitis  - pantoprazole (PROTONIX) 20 MG tablet; Take 1 Tablet by mouth every day.  Dispense: 90 Tablet; Refill: 1  - pantoprazole (PROTONIX) 20 MG tablet; Take 1 Tablet by mouth every day.  Dispense: 90 Tablet; Refill: 0  Refill on Protonix 20 mg.  I will have patient purchase through [facility] 3-month supply of Protonix.  We will probably need to submit a PA for Protonix.  She has tried omeprazole, Nexium, Zantac, Pepcid, however Protonix is the only thing that has been helping her.    2. Chronic midline low back pain with bilateral sciatica  - pregabalin (LYRICA) 150 MG Cap; Take 1 Capsule by mouth every day for 90 days.  Dispense: 270 Capsule; Refill: 0  Patient would like to increase her Lyrica as she states that she is still having some pain at the end of the day.  Increase to 150 mg 3 times daily    3. Moderate episode of recurrent major depressive disorder (HCC)  - venlafaxine XR (EFFEXOR XR) 37.5 MG CAPSULE SR 24 HR; Take 2 Capsules by mouth every day.  Dispense: 90 Capsule; Refill: 1  Decrease Effexor to 37.5 mg.  I did suggest that she discuss with her gynecologist about switching to Seasonale or something that would allow her 3 months without a period.  May be hormonal fluctuations are causing a significant amount of mood swing.    4. Perimenopausal symptoms  - venlafaxine XR (EFFEXOR XR) 37.5 MG CAPSULE SR 24 HR; Take 2 Capsules by mouth every day.  Dispense: 90 Capsule; Refill: 1      Return in about 3 months (around 11/19/2022).    Please note that this dictation was created using voice recognition software. I have made every reasonable attempt to correct obvious errors, but I expect that there are errors of grammar and possibly  content that I did not discover before finalizing the note.

## 2022-08-19 NOTE — ASSESSMENT & PLAN NOTE
Patient states lyrica is helping significantly with neuropathic pain on the left leg, but still gets some discomfort on the left leg at nighttime. Would like to increase lyrica

## 2022-08-19 NOTE — ASSESSMENT & PLAN NOTE
Patient has been on Effexor 75 mg, however she states that gives her significant mount of drowsiness.  She was doing okay on Effexor 37.5 mg and would like to switch back to it.  She states that it was helping her mood, however still has significant mood swings during the week of her period.  She is currently on OCPs as prescribed by her gynecologist.  She recently had a follow-up with them and they suggested that she continue it.

## 2022-08-19 NOTE — ASSESSMENT & PLAN NOTE
Pt has been doing very well on pantoprazole and would like refill, hasn't been able to reach Dr. De León's office for prescriptions

## 2022-09-02 DIAGNOSIS — F33.1 MODERATE EPISODE OF RECURRENT MAJOR DEPRESSIVE DISORDER (HCC): ICD-10-CM

## 2022-09-02 DIAGNOSIS — N95.1 PERIMENOPAUSAL SYMPTOMS: ICD-10-CM

## 2022-09-02 RX ORDER — VENLAFAXINE HYDROCHLORIDE 75 MG/1
75 CAPSULE, EXTENDED RELEASE ORAL DAILY
Qty: 90 CAPSULE | Refills: 1 | Status: SHIPPED | OUTPATIENT
Start: 2022-09-02 | End: 2022-11-16 | Stop reason: SDUPTHER

## 2022-09-19 ENCOUNTER — HOSPITAL ENCOUNTER (OUTPATIENT)
Dept: LAB | Facility: MEDICAL CENTER | Age: 47
End: 2022-09-19
Attending: NURSE PRACTITIONER
Payer: MEDICAID

## 2022-09-19 LAB
25(OH)D3 SERPL-MCNC: 40 NG/ML (ref 30–100)
ALBUMIN SERPL BCP-MCNC: 4.3 G/DL (ref 3.2–4.9)
ALBUMIN/GLOB SERPL: 1.4 G/DL
ALP SERPL-CCNC: 65 U/L (ref 30–99)
ALT SERPL-CCNC: 11 U/L (ref 2–50)
ANION GAP SERPL CALC-SCNC: 10 MMOL/L (ref 7–16)
AST SERPL-CCNC: 14 U/L (ref 12–45)
BASOPHILS # BLD AUTO: 0.7 % (ref 0–1.8)
BASOPHILS # BLD: 0.05 K/UL (ref 0–0.12)
BILIRUB SERPL-MCNC: 0.4 MG/DL (ref 0.1–1.5)
BUN SERPL-MCNC: 14 MG/DL (ref 8–22)
CALCIUM SERPL-MCNC: 9.6 MG/DL (ref 8.5–10.5)
CHLORIDE SERPL-SCNC: 105 MMOL/L (ref 96–112)
CHOLEST SERPL-MCNC: 192 MG/DL (ref 100–199)
CO2 SERPL-SCNC: 25 MMOL/L (ref 20–33)
CREAT SERPL-MCNC: 0.67 MG/DL (ref 0.5–1.4)
EOSINOPHIL # BLD AUTO: 0.14 K/UL (ref 0–0.51)
EOSINOPHIL NFR BLD: 2 % (ref 0–6.9)
ERYTHROCYTE [DISTWIDTH] IN BLOOD BY AUTOMATED COUNT: 46.5 FL (ref 35.9–50)
EST. AVERAGE GLUCOSE BLD GHB EST-MCNC: 88 MG/DL
FERRITIN SERPL-MCNC: 298 NG/ML (ref 10–291)
FOLATE SERPL-MCNC: 13.8 NG/ML
GFR SERPLBLD CREATININE-BSD FMLA CKD-EPI: 108 ML/MIN/1.73 M 2
GLOBULIN SER CALC-MCNC: 3 G/DL (ref 1.9–3.5)
GLUCOSE SERPL-MCNC: 79 MG/DL (ref 65–99)
HBA1C MFR BLD: 4.7 % (ref 4–5.6)
HCT VFR BLD AUTO: 42 % (ref 37–47)
HDLC SERPL-MCNC: 39 MG/DL
HGB BLD-MCNC: 13.4 G/DL (ref 12–16)
IMM GRANULOCYTES # BLD AUTO: 0.05 K/UL (ref 0–0.11)
IMM GRANULOCYTES NFR BLD AUTO: 0.7 % (ref 0–0.9)
IRON SATN MFR SERPL: 47 % (ref 15–55)
IRON SERPL-MCNC: 128 UG/DL (ref 40–170)
LDLC SERPL CALC-MCNC: 126 MG/DL
LYMPHOCYTES # BLD AUTO: 1.48 K/UL (ref 1–4.8)
LYMPHOCYTES NFR BLD: 21.1 % (ref 22–41)
MCH RBC QN AUTO: 32.3 PG (ref 27–33)
MCHC RBC AUTO-ENTMCNC: 31.9 G/DL (ref 33.6–35)
MCV RBC AUTO: 101.2 FL (ref 81.4–97.8)
MONOCYTES # BLD AUTO: 0.3 K/UL (ref 0–0.85)
MONOCYTES NFR BLD AUTO: 4.3 % (ref 0–13.4)
NEUTROPHILS # BLD AUTO: 4.98 K/UL (ref 2–7.15)
NEUTROPHILS NFR BLD: 71.2 % (ref 44–72)
NRBC # BLD AUTO: 0 K/UL
NRBC BLD-RTO: 0 /100 WBC
PLATELET # BLD AUTO: 247 K/UL (ref 164–446)
PMV BLD AUTO: 11 FL (ref 9–12.9)
POTASSIUM SERPL-SCNC: 4.4 MMOL/L (ref 3.6–5.5)
PREALB SERPL-MCNC: 25.6 MG/DL (ref 18–38)
PROT SERPL-MCNC: 7.3 G/DL (ref 6–8.2)
RBC # BLD AUTO: 4.15 M/UL (ref 4.2–5.4)
SODIUM SERPL-SCNC: 140 MMOL/L (ref 135–145)
TIBC SERPL-MCNC: 273 UG/DL (ref 250–450)
TRANSFERRIN SERPL-MCNC: 208 MG/DL (ref 200–370)
TRIGL SERPL-MCNC: 136 MG/DL (ref 0–149)
TSH SERPL DL<=0.005 MIU/L-ACNC: 1.19 UIU/ML (ref 0.38–5.33)
UIBC SERPL-MCNC: 145 UG/DL (ref 110–370)
VIT B12 SERPL-MCNC: 637 PG/ML (ref 211–911)
WBC # BLD AUTO: 7 K/UL (ref 4.8–10.8)

## 2022-09-19 PROCEDURE — 84466 ASSAY OF TRANSFERRIN: CPT

## 2022-09-19 PROCEDURE — 84252 ASSAY OF VITAMIN B-2: CPT

## 2022-09-19 PROCEDURE — 84134 ASSAY OF PREALBUMIN: CPT

## 2022-09-19 PROCEDURE — 83550 IRON BINDING TEST: CPT

## 2022-09-19 PROCEDURE — 82607 VITAMIN B-12: CPT

## 2022-09-19 PROCEDURE — 36415 COLL VENOUS BLD VENIPUNCTURE: CPT

## 2022-09-19 PROCEDURE — 80061 LIPID PANEL: CPT

## 2022-09-19 PROCEDURE — 82746 ASSAY OF FOLIC ACID SERUM: CPT

## 2022-09-19 PROCEDURE — 84207 ASSAY OF VITAMIN B-6: CPT

## 2022-09-19 PROCEDURE — 84443 ASSAY THYROID STIM HORMONE: CPT

## 2022-09-19 PROCEDURE — 84630 ASSAY OF ZINC: CPT

## 2022-09-19 PROCEDURE — 84425 ASSAY OF VITAMIN B-1: CPT

## 2022-09-19 PROCEDURE — 82728 ASSAY OF FERRITIN: CPT

## 2022-09-19 PROCEDURE — 80053 COMPREHEN METABOLIC PANEL: CPT

## 2022-09-19 PROCEDURE — 83540 ASSAY OF IRON: CPT

## 2022-09-19 PROCEDURE — 82306 VITAMIN D 25 HYDROXY: CPT

## 2022-09-19 PROCEDURE — 83036 HEMOGLOBIN GLYCOSYLATED A1C: CPT

## 2022-09-19 PROCEDURE — 85025 COMPLETE CBC W/AUTO DIFF WBC: CPT

## 2022-09-20 DIAGNOSIS — M54.41 CHRONIC MIDLINE LOW BACK PAIN WITH BILATERAL SCIATICA: ICD-10-CM

## 2022-09-20 DIAGNOSIS — M54.42 CHRONIC MIDLINE LOW BACK PAIN WITH BILATERAL SCIATICA: ICD-10-CM

## 2022-09-20 DIAGNOSIS — G89.29 CHRONIC MIDLINE LOW BACK PAIN WITH BILATERAL SCIATICA: ICD-10-CM

## 2022-09-20 RX ORDER — PREGABALIN 150 MG/1
150 CAPSULE ORAL 3 TIMES DAILY
Qty: 270 CAPSULE | Refills: 0 | Status: SHIPPED | OUTPATIENT
Start: 2022-09-20 | End: 2022-12-05 | Stop reason: SDUPTHER

## 2022-09-23 LAB — ZINC SERPL-MCNC: 74.2 UG/DL (ref 60–120)

## 2022-09-24 LAB — VIT B2 SERPL-SCNC: 7 NMOL/L (ref 5–50)

## 2022-09-25 LAB — VIT B1 BLD-MCNC: 94 NMOL/L (ref 70–180)

## 2022-09-26 LAB — VIT B6 SERPL-MCNC: 109.7 NMOL/L (ref 20–125)

## 2022-10-14 DIAGNOSIS — M54.42 CHRONIC MIDLINE LOW BACK PAIN WITH LEFT-SIDED SCIATICA: ICD-10-CM

## 2022-10-14 DIAGNOSIS — G89.29 CHRONIC MIDLINE LOW BACK PAIN WITH LEFT-SIDED SCIATICA: ICD-10-CM

## 2022-10-18 RX ORDER — METHOCARBAMOL 500 MG/1
500 TABLET, FILM COATED ORAL 4 TIMES DAILY PRN
Qty: 60 TABLET | Refills: 1 | Status: SHIPPED | OUTPATIENT
Start: 2022-10-18 | End: 2023-01-07 | Stop reason: SDUPTHER

## 2022-11-16 DIAGNOSIS — N95.1 PERIMENOPAUSAL SYMPTOMS: ICD-10-CM

## 2022-11-16 DIAGNOSIS — F33.1 MODERATE EPISODE OF RECURRENT MAJOR DEPRESSIVE DISORDER (HCC): ICD-10-CM

## 2022-11-16 RX ORDER — VENLAFAXINE HYDROCHLORIDE 75 MG/1
75 CAPSULE, EXTENDED RELEASE ORAL DAILY
Qty: 30 CAPSULE | Refills: 5 | Status: SHIPPED | OUTPATIENT
Start: 2022-11-16 | End: 2023-01-07 | Stop reason: SDUPTHER

## 2022-11-29 ENCOUNTER — TELEPHONE (OUTPATIENT)
Dept: MEDICAL GROUP | Facility: MEDICAL CENTER | Age: 47
End: 2022-11-29
Payer: MEDICAID

## 2022-12-09 ENCOUNTER — TELEPHONE (OUTPATIENT)
Dept: MEDICAL GROUP | Facility: MEDICAL CENTER | Age: 47
End: 2022-12-09
Payer: MEDICAID

## 2022-12-09 NOTE — TELEPHONE ENCOUNTER
DOCUMENTATION OF PAR STATUS:    1. Name of Medication & Dose:  pregabalin (LYRICA) 150 MG Cap      2. Name of Prescription Coverage Company & phone #: Medicaid ffs      3. Date Prior Auth Submitted: 12/09/22    4. What information was given to obtain insurance decision? Chart notes, icd-10 code, med hx.    5. Prior Auth Status? Pending    6. Patient Notified: N\A

## 2022-12-15 DIAGNOSIS — J45.909 ASTHMA DUE TO SEASONAL ALLERGIES: ICD-10-CM

## 2022-12-22 DIAGNOSIS — G89.29 CHRONIC MIDLINE LOW BACK PAIN WITH BILATERAL SCIATICA: ICD-10-CM

## 2022-12-22 DIAGNOSIS — M54.41 CHRONIC MIDLINE LOW BACK PAIN WITH BILATERAL SCIATICA: ICD-10-CM

## 2022-12-22 DIAGNOSIS — M54.42 CHRONIC MIDLINE LOW BACK PAIN WITH BILATERAL SCIATICA: ICD-10-CM

## 2022-12-22 NOTE — TELEPHONE ENCOUNTER
Received request via: Pharmacy    Was the patient seen in the last year in this department? Yes    Does the patient have an active prescription (recently filled or refills available) for medication(s) requested? No    Does the patient have senior living Plus and need 100 day supply (blood pressure, diabetes and cholesterol meds only)? Patient does not have SCP

## 2022-12-28 RX ORDER — LEVOCETIRIZINE DIHYDROCHLORIDE 5 MG/1
5 TABLET, FILM COATED ORAL DAILY
Qty: 90 TABLET | Refills: 1 | Status: SHIPPED | OUTPATIENT
Start: 2022-12-28 | End: 2023-04-04

## 2022-12-28 RX ORDER — PREGABALIN 150 MG/1
150 CAPSULE ORAL 3 TIMES DAILY
Qty: 270 CAPSULE | Refills: 0 | OUTPATIENT
Start: 2022-12-28 | End: 2023-03-28

## 2023-01-03 DIAGNOSIS — G89.29 CHRONIC MIDLINE LOW BACK PAIN WITH BILATERAL SCIATICA: ICD-10-CM

## 2023-01-03 DIAGNOSIS — M54.41 CHRONIC MIDLINE LOW BACK PAIN WITH BILATERAL SCIATICA: ICD-10-CM

## 2023-01-03 DIAGNOSIS — M54.42 CHRONIC MIDLINE LOW BACK PAIN WITH BILATERAL SCIATICA: ICD-10-CM

## 2023-01-03 NOTE — TELEPHONE ENCOUNTER
Received request via: Pharmacy    Was the patient seen in the last year in this department? Yes    Does the patient have an active prescription (recently filled or refills available) for medication(s) requested? No  PHARMacy is requiring a new RX

## 2023-01-05 RX ORDER — PREGABALIN 150 MG/1
150 CAPSULE ORAL 3 TIMES DAILY
Qty: 270 CAPSULE | Refills: 0 | OUTPATIENT
Start: 2023-01-05 | End: 2023-04-05

## 2023-01-06 ENCOUNTER — HOSPITAL ENCOUNTER (OUTPATIENT)
Dept: LAB | Facility: MEDICAL CENTER | Age: 48
End: 2023-01-06
Attending: NURSE PRACTITIONER
Payer: MEDICAID

## 2023-01-06 LAB
25(OH)D3 SERPL-MCNC: 28 NG/ML (ref 30–100)
ALBUMIN SERPL BCP-MCNC: 4.3 G/DL (ref 3.2–4.9)
ALBUMIN/GLOB SERPL: 1.5 G/DL
ALP SERPL-CCNC: 56 U/L (ref 30–99)
ALT SERPL-CCNC: 10 U/L (ref 2–50)
ANION GAP SERPL CALC-SCNC: 8 MMOL/L (ref 7–16)
AST SERPL-CCNC: 15 U/L (ref 12–45)
BASOPHILS # BLD AUTO: 0.8 % (ref 0–1.8)
BASOPHILS # BLD: 0.05 K/UL (ref 0–0.12)
BILIRUB SERPL-MCNC: 0.5 MG/DL (ref 0.1–1.5)
BUN SERPL-MCNC: 20 MG/DL (ref 8–22)
CALCIUM ALBUM COR SERPL-MCNC: 9.3 MG/DL (ref 8.5–10.5)
CALCIUM SERPL-MCNC: 9.5 MG/DL (ref 8.5–10.5)
CHLORIDE SERPL-SCNC: 103 MMOL/L (ref 96–112)
CHOLEST SERPL-MCNC: 193 MG/DL (ref 100–199)
CO2 SERPL-SCNC: 26 MMOL/L (ref 20–33)
CREAT SERPL-MCNC: 0.64 MG/DL (ref 0.5–1.4)
EOSINOPHIL # BLD AUTO: 0.1 K/UL (ref 0–0.51)
EOSINOPHIL NFR BLD: 1.7 % (ref 0–6.9)
ERYTHROCYTE [DISTWIDTH] IN BLOOD BY AUTOMATED COUNT: 49.2 FL (ref 35.9–50)
FERRITIN SERPL-MCNC: 280 NG/ML (ref 10–291)
FOLATE SERPL-MCNC: 12.5 NG/ML
GFR SERPLBLD CREATININE-BSD FMLA CKD-EPI: 109 ML/MIN/1.73 M 2
GLOBULIN SER CALC-MCNC: 2.8 G/DL (ref 1.9–3.5)
GLUCOSE SERPL-MCNC: 83 MG/DL (ref 65–99)
HCT VFR BLD AUTO: 41.1 % (ref 37–47)
HDLC SERPL-MCNC: 47 MG/DL
HGB BLD-MCNC: 13.3 G/DL (ref 12–16)
IMM GRANULOCYTES # BLD AUTO: 0.01 K/UL (ref 0–0.11)
IMM GRANULOCYTES NFR BLD AUTO: 0.2 % (ref 0–0.9)
IRON SERPL-MCNC: 139 UG/DL (ref 40–170)
LDLC SERPL CALC-MCNC: 126 MG/DL
LYMPHOCYTES # BLD AUTO: 1.4 K/UL (ref 1–4.8)
LYMPHOCYTES NFR BLD: 23.5 % (ref 22–41)
MCH RBC QN AUTO: 32.8 PG (ref 27–33)
MCHC RBC AUTO-ENTMCNC: 32.4 G/DL (ref 33.6–35)
MCV RBC AUTO: 101.2 FL (ref 81.4–97.8)
MONOCYTES # BLD AUTO: 0.36 K/UL (ref 0–0.85)
MONOCYTES NFR BLD AUTO: 6.1 % (ref 0–13.4)
NEUTROPHILS # BLD AUTO: 4.03 K/UL (ref 2–7.15)
NEUTROPHILS NFR BLD: 67.7 % (ref 44–72)
NRBC # BLD AUTO: 0 K/UL
NRBC BLD-RTO: 0 /100 WBC
PLATELET # BLD AUTO: 214 K/UL (ref 164–446)
PMV BLD AUTO: 11.3 FL (ref 9–12.9)
POTASSIUM SERPL-SCNC: 4.9 MMOL/L (ref 3.6–5.5)
PREALB SERPL-MCNC: 26.1 MG/DL (ref 18–38)
PROT SERPL-MCNC: 7.1 G/DL (ref 6–8.2)
RBC # BLD AUTO: 4.06 M/UL (ref 4.2–5.4)
SODIUM SERPL-SCNC: 137 MMOL/L (ref 135–145)
TRANSFERRIN SERPL-MCNC: 219 MG/DL (ref 200–370)
TRIGL SERPL-MCNC: 99 MG/DL (ref 0–149)
VIT B12 SERPL-MCNC: 669 PG/ML (ref 211–911)
WBC # BLD AUTO: 6 K/UL (ref 4.8–10.8)

## 2023-01-06 PROCEDURE — 80061 LIPID PANEL: CPT

## 2023-01-06 PROCEDURE — 82746 ASSAY OF FOLIC ACID SERUM: CPT

## 2023-01-06 PROCEDURE — 82607 VITAMIN B-12: CPT

## 2023-01-06 PROCEDURE — 84466 ASSAY OF TRANSFERRIN: CPT

## 2023-01-06 PROCEDURE — 82728 ASSAY OF FERRITIN: CPT

## 2023-01-06 PROCEDURE — 84252 ASSAY OF VITAMIN B-2: CPT

## 2023-01-06 PROCEDURE — 84630 ASSAY OF ZINC: CPT

## 2023-01-06 PROCEDURE — 36415 COLL VENOUS BLD VENIPUNCTURE: CPT

## 2023-01-06 PROCEDURE — 82306 VITAMIN D 25 HYDROXY: CPT

## 2023-01-06 PROCEDURE — 84134 ASSAY OF PREALBUMIN: CPT

## 2023-01-06 PROCEDURE — 84207 ASSAY OF VITAMIN B-6: CPT

## 2023-01-06 PROCEDURE — 80053 COMPREHEN METABOLIC PANEL: CPT

## 2023-01-06 PROCEDURE — 83540 ASSAY OF IRON: CPT

## 2023-01-06 PROCEDURE — 85025 COMPLETE CBC W/AUTO DIFF WBC: CPT

## 2023-01-06 PROCEDURE — 84425 ASSAY OF VITAMIN B-1: CPT

## 2023-01-07 DIAGNOSIS — G89.29 CHRONIC MIDLINE LOW BACK PAIN WITH LEFT-SIDED SCIATICA: ICD-10-CM

## 2023-01-07 DIAGNOSIS — M54.42 CHRONIC MIDLINE LOW BACK PAIN WITH LEFT-SIDED SCIATICA: ICD-10-CM

## 2023-01-09 LAB — ZINC SERPL-MCNC: 82.4 UG/DL (ref 60–120)

## 2023-01-09 NOTE — TELEPHONE ENCOUNTER
Received request via: Pharmacy    Was the patient seen in the last year in this department? Yes    Does the patient have an active prescription (recently filled or refills available) for medication(s) requested? No    Does the patient have halfway Plus and need 100 day supply (blood pressure, diabetes and cholesterol meds only)? Patient does not have SCP

## 2023-01-10 LAB — VIT B2 SERPL-SCNC: 95 NMOL/L (ref 5–50)

## 2023-01-10 RX ORDER — METHOCARBAMOL 500 MG/1
500 TABLET, FILM COATED ORAL 4 TIMES DAILY PRN
Qty: 60 TABLET | Refills: 1 | Status: SHIPPED | OUTPATIENT
Start: 2023-01-10 | End: 2023-02-14 | Stop reason: SDUPTHER

## 2023-01-11 LAB — MISCELLANEOUS LAB RESULT MISCLAB: ABNORMAL

## 2023-01-12 LAB — VIT B6 SERPL-MCNC: 228.9 NMOL/L (ref 20–125)

## 2023-01-25 DIAGNOSIS — G89.29 CHRONIC MIDLINE LOW BACK PAIN WITH BILATERAL SCIATICA: ICD-10-CM

## 2023-01-25 DIAGNOSIS — M54.42 CHRONIC MIDLINE LOW BACK PAIN WITH BILATERAL SCIATICA: ICD-10-CM

## 2023-01-25 DIAGNOSIS — M54.41 CHRONIC MIDLINE LOW BACK PAIN WITH BILATERAL SCIATICA: ICD-10-CM

## 2023-01-25 RX ORDER — PREGABALIN 150 MG/1
150 CAPSULE ORAL 3 TIMES DAILY
Qty: 270 CAPSULE | Refills: 0 | Status: SHIPPED | OUTPATIENT
Start: 2023-01-25 | End: 2023-05-03 | Stop reason: SDUPTHER

## 2023-02-14 DIAGNOSIS — M54.42 CHRONIC MIDLINE LOW BACK PAIN WITH LEFT-SIDED SCIATICA: ICD-10-CM

## 2023-02-14 DIAGNOSIS — G89.29 CHRONIC MIDLINE LOW BACK PAIN WITH LEFT-SIDED SCIATICA: ICD-10-CM

## 2023-02-16 RX ORDER — METHOCARBAMOL 500 MG/1
500 TABLET, FILM COATED ORAL 4 TIMES DAILY PRN
Qty: 60 TABLET | Refills: 1 | Status: SHIPPED | OUTPATIENT
Start: 2023-02-16 | End: 2023-04-04

## 2023-02-17 DIAGNOSIS — J45.909 ASTHMA DUE TO SEASONAL ALLERGIES: ICD-10-CM

## 2023-02-17 RX ORDER — ALBUTEROL SULFATE 90 UG/1
2 AEROSOL, METERED RESPIRATORY (INHALATION) EVERY 6 HOURS PRN
Qty: 18 G | Refills: 1 | Status: SHIPPED | OUTPATIENT
Start: 2023-02-17 | End: 2023-04-18 | Stop reason: SDUPTHER

## 2023-04-04 ENCOUNTER — OFFICE VISIT (OUTPATIENT)
Dept: MEDICAL GROUP | Facility: MEDICAL CENTER | Age: 48
End: 2023-04-04
Attending: FAMILY MEDICINE
Payer: MEDICAID

## 2023-04-04 VITALS
HEIGHT: 69 IN | DIASTOLIC BLOOD PRESSURE: 92 MMHG | SYSTOLIC BLOOD PRESSURE: 142 MMHG | TEMPERATURE: 97.2 F | RESPIRATION RATE: 14 BRPM | BODY MASS INDEX: 28.91 KG/M2 | OXYGEN SATURATION: 99 % | HEART RATE: 65 BPM | WEIGHT: 195.2 LBS

## 2023-04-04 DIAGNOSIS — Z12.11 SCREENING FOR COLON CANCER: ICD-10-CM

## 2023-04-04 DIAGNOSIS — M54.42 CHRONIC MIDLINE LOW BACK PAIN WITH BILATERAL SCIATICA: ICD-10-CM

## 2023-04-04 DIAGNOSIS — G89.29 CHRONIC MIDLINE LOW BACK PAIN WITH BILATERAL SCIATICA: ICD-10-CM

## 2023-04-04 DIAGNOSIS — F33.1 MODERATE EPISODE OF RECURRENT MAJOR DEPRESSIVE DISORDER (HCC): ICD-10-CM

## 2023-04-04 DIAGNOSIS — R03.0 ELEVATED BLOOD PRESSURE READING: ICD-10-CM

## 2023-04-04 DIAGNOSIS — M54.41 CHRONIC MIDLINE LOW BACK PAIN WITH BILATERAL SCIATICA: ICD-10-CM

## 2023-04-04 PROBLEM — Z90.3 HISTORY OF SLEEVE GASTRECTOMY: Status: ACTIVE | Noted: 2023-04-04

## 2023-04-04 PROBLEM — M54.12 CERVICAL RADICULOPATHY: Status: ACTIVE | Noted: 2022-10-14

## 2023-04-04 PROBLEM — J22 LOWER RESPIRATORY TRACT INFECTION: Status: RESOLVED | Noted: 2021-04-07 | Resolved: 2023-04-04

## 2023-04-04 PROCEDURE — 99215 OFFICE O/P EST HI 40 MIN: CPT | Performed by: FAMILY MEDICINE

## 2023-04-04 PROCEDURE — 99213 OFFICE O/P EST LOW 20 MIN: CPT | Performed by: FAMILY MEDICINE

## 2023-04-04 RX ORDER — TIZANIDINE 4 MG/1
TABLET ORAL
COMMUNITY
End: 2023-04-04

## 2023-04-04 RX ORDER — CHOLECALCIFEROL (VITAMIN D3) 10(400)/ML
DROPS ORAL
COMMUNITY

## 2023-04-04 RX ORDER — CHLORAL HYDRATE 500 MG
CAPSULE ORAL
COMMUNITY
End: 2023-04-04

## 2023-04-04 RX ORDER — IPRATROPIUM BROMIDE 21 UG/1
SPRAY, METERED NASAL
COMMUNITY
Start: 2023-03-13

## 2023-04-04 RX ORDER — NORGESTIMATE AND ETHINYL ESTRADIOL 7DAYSX3 LO
KIT ORAL
COMMUNITY
End: 2023-04-04

## 2023-04-04 RX ORDER — CALCIUM CARBONATE 260MG(650)
TABLET,CHEWABLE ORAL
COMMUNITY
End: 2023-04-04

## 2023-04-04 RX ORDER — ASCORBIC ACID 100 MG
TABLET,CHEWABLE ORAL
COMMUNITY

## 2023-04-04 RX ORDER — CYCLOBENZAPRINE HCL 10 MG
TABLET ORAL
COMMUNITY
End: 2023-04-04

## 2023-04-04 RX ORDER — PREGABALIN 100 MG/1
CAPSULE ORAL
COMMUNITY
End: 2023-04-04

## 2023-04-04 RX ORDER — NORETHINDRONE ACETATE AND ETHINYL ESTRADIOL AND FERROUS FUMARATE 1MG-20(24)
KIT ORAL
COMMUNITY

## 2023-04-04 RX ORDER — AZELASTINE HYDROCHLORIDE, FLUTICASONE PROPIONATE 137; 50 UG/1; UG/1
SPRAY, METERED NASAL
COMMUNITY
Start: 2023-02-12

## 2023-04-04 RX ORDER — VENLAFAXINE HYDROCHLORIDE 37.5 MG/1
CAPSULE, EXTENDED RELEASE ORAL
COMMUNITY
End: 2023-04-04

## 2023-04-04 RX ORDER — SCOLOPAMINE TRANSDERMAL SYSTEM 1 MG/1
PATCH, EXTENDED RELEASE TRANSDERMAL
COMMUNITY

## 2023-04-04 RX ORDER — DEXAMETHASONE SODIUM PHOSPHATE 10 MG/ML
INJECTION INTRAMUSCULAR; INTRAVENOUS
COMMUNITY
End: 2023-04-04

## 2023-04-04 RX ORDER — METHOCARBAMOL 100 MG/ML
INJECTION, SOLUTION INTRAMUSCULAR; INTRAVENOUS
COMMUNITY
End: 2023-04-04

## 2023-04-04 RX ORDER — IRON,CARBONYL/ASCORBIC ACID 100-250 MG
TABLET ORAL
COMMUNITY

## 2023-04-04 RX ORDER — SOY ISOFLAVONE 40 MG
TABLET ORAL
COMMUNITY

## 2023-04-04 ASSESSMENT — PATIENT HEALTH QUESTIONNAIRE - PHQ9
5. POOR APPETITE OR OVEREATING: 0 - NOT AT ALL
CLINICAL INTERPRETATION OF PHQ2 SCORE: 6
SUM OF ALL RESPONSES TO PHQ QUESTIONS 1-9: 13

## 2023-04-04 ASSESSMENT — FIBROSIS 4 INDEX: FIB4 SCORE: 1.04

## 2023-04-04 NOTE — ASSESSMENT & PLAN NOTE
Pt reporting worsening of her low back pain with radiation to the left leg.   She has been seeing pain management at [facility]. There they have done CSIs, the first with 3-4 weeks relief, the second with 2 days of slight relief  The pain in the back has been getting worse  She has gone through physical therapy with slight improvement but not enough  She had L spine MRI completed at [facility] in the fall of 2022.   Meds: Tizanadine 4mg prn, lyrica 150mg TID, tylenol 650 x 3 in the AM and x 2 in the pm. NSAIDs contraindicated due to bariatric surgery.

## 2023-04-04 NOTE — LETTER
[facility]  Odilia Engel M.D.  [address]  [place] [address]  Fax: [phone]   Authorization for Release/Disclosure of   Protected Health Information   Name: RINA SIMON PINEDA : 1975 SSN: [id number]   Address: [address] [place] [address] Phone:    [phone] (home)    I authorize the entity listed below to release/disclose the PHI below to:   [facility]/Odilia Engel M.D. and Odilia Engel M.D.   Provider or Entity Name:  Dr. Andrea Hughes pain management   Address   City, [facility]   Phone:      Fax:     Reason for request: continuity of care   Information to be released:    [  ] LAST COLONOSCOPY,  including any PATH REPORT and follow-up  [  ] LAST FIT/COLOGUARD RESULT [  ] LAST DEXA  [  ] LAST MAMMOGRAM  [  ] LAST PAP  [  ] LAST LABS [  ] RETINA EXAM REPORT  [  ] IMMUNIZATION RECORDS  X  ] Release all info      [  ] Check here and initial the line next to each item to release ALL health information INCLUDING  _____ Care and treatment for drug and / or alcohol abuse  _____ HIV testing, infection status, or AIDS  _____ Genetic Testing    DATES OF SERVICE OR TIME PERIOD TO BE DISCLOSED: _____________  I understand and acknowledge that:  * This Authorization may be revoked at any time by you in writing, except if your health information has already been used or disclosed.  * Your health information that will be used or disclosed as a result of you signing this authorization could be re-disclosed by the recipient. If this occurs, your re-disclosed health information may no longer be protected by State or Federal laws.  * You may refuse to sign this Authorization. Your refusal will not affect your ability to obtain treatment.  * This Authorization becomes effective upon signing and will  on (date) __________.      If no date is indicated, this Authorization will  one (1) year from the signature date.    Name: Rina Simon Pineda  Signature: Date:   2023     PLEASE  FAX REQUESTED RECORDS BACK TO: [phone]

## 2023-04-04 NOTE — PROGRESS NOTES
Subjective:     CC:   Chief Complaint   Patient presents with    Back Pain     Severe   Pain since 2011  Lower back         HPI:     Chronic midline low back pain with bilateral sciatica  Pt reporting worsening of her low back pain with radiation to the left leg.   She has been seeing pain management at [facility]. There they have done CSIs, the first with 3-4 weeks relief, the second with 2 days of slight relief  The pain in the back has been getting worse  She has gone through physical therapy with slight improvement but not enough  She had L spine MRI completed at [facility] in the fall of 2022.   Meds: Tizanadine 4mg prn, lyrica 150mg TID, tylenol 650 x 3 in the AM and x 2 in the pm. NSAIDs contraindicated due to bariatric surgery.     Elevated blood pressure reading  Pt reports recent bps have been 140/90  Reports increasing stress recently   is disabled  Reports having a short fuse, easily irritated.   Already on effexor 75mg daily, but doesn't want to increase it as she reports being on so many meds already      Major depressive disorder  Pt reporting worsening and increasing stressors   They have had some legal issues,  is disabled.  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  She feels like it may be related to premenopausal symptoms and stress.  Her blood pressures at home have also been around 140/90 and blood pressures at her appointments have also been around there.      Past Medical History:   Diagnosis Date    Allergy     seasonal, meds    Anemia 01/19/2022    pt takes iron    Anxiety     Arrhythmia     Arthritis 01/19/2022    osteo lower back    Asthma     as a child    Back pain 01/19/2022    Colitis 8/6/2021    Depression     Diabetes (HCC)     Prediabetes    GERD (gastroesophageal reflux disease)     Glaucoma 01/19/2022    right eye    Head ache     with her period    Heart burn     Hypertension     Migraine     stopped when she moved out  [facility], possibly allergy related    Pain of right eye 2016    Painful breathing     Pneumonia 03/15/2020    Shortness of breath     Sleep apnea 2022    CPAP    Thyroid disease     Wart 2016       Social History     Tobacco Use    Smoking status: Former     Packs/day: 1.00     Years: 35.00     Pack years: 35.00     Types: Cigarettes     Start date:      Quit date:      Years since quittin.2    Smokeless tobacco: Never   Vaping Use    Vaping Use: Never used   Substance Use Topics    Alcohol use: No     Alcohol/week: 0.0 oz    Drug use: Yes     Frequency: 3.0 times per week     Comment: weed for sleep and pain       Current Outpatient Medications Ordered in Epic   Medication Sig Dispense Refill    Collagen-Vitamin C-Biotin (COLLAGEN 1500/C PO) Collagen      Flaxseed, Linseed, (FLAX SEEDS PO) Flax Seeds      Iron-Vitamin C 100-250 MG Tab Iron      Methylsulfonylmethane (MSM) 1000 MG Cap MSM      Multiple Vitamins-Minerals (MULTIVITAMIN ADULT EXTRA C PO) Multivitamin      Ascorbic Acid (VITAMIN C) 100 MG Chew Tab Vitamin C      vitamin D (JUST D) 400 Units/mL Liquid Vitamin D      Azelastine-Fluticasone 137-50 MCG/ACT Suspension SHAKE LIQUID AND USE 1 SPRAY IN EACH NOSTRIL TWICE DAILY      ipratropium (ATROVENT) 0.03 % Solution USE 1 SPRAY IN EACH NOSTRIL 1 TO 3 TIMES A DAY AS DIRECTED      albuterol 108 (90 Base) MCG/ACT Aero Soln inhalation aerosol Inhale 2 Puffs every 6 hours as needed for Shortness of Breath. 18 g 1    pregabalin (LYRICA) 150 MG Cap Take 1 Capsule by mouth in the morning, at noon, and at bedtime for 90 days. 270 Capsule 0    pantoprazole (PROTONIX) 20 MG tablet Take 1 Tablet by mouth every day. 90 Tablet 1    venlafaxine XR (EFFEXOR XR) 75 MG CAPSULE SR 24 HR Take 1 Capsule by mouth every day. 30 Capsule 5    Collagen-Vitamin C-Biotin (COLLAGEN 1500/C) 500-50-0.8 MG Cap       Zinc 10 MG Lozenge       tizanidine (ZANAFLEX) 4 MG Tab TAKE 1 TABLET EVERY 8 HOURS AS  "NEEDED FOR SPASMS MAY CAUSE SEDATION      acetaminophen (TYLENOL) 500 MG Tab Take 1,000 mg by mouth 1 time a day as needed. Indications: Pain      Norethin Ace-Eth Estrad-FE 1-20 MG-MCG(24) Tab Junel Fe 24 1 mg-20 mcg (24)/75 mg (4) tablet   TAKE 1 TABLET BY MOUTH DAILY      vitamin A 15 MG/ML Solution Vitamin A (Patient not taking: Reported on 4/4/2023)      scopolamine (TRANSDERM-SCOP) 1 mg/72hr PATCH 72 HR scopolamine 1 mg over 3 days transdermal patch   APPLY ONE PATCH BEHIND AN EAR EVERY 3 DAYS AS DIRECTED (Patient not taking: Reported on 4/4/2023)      therapeutic multivitamin-minerals (THERAGRAN-M) Tab Take 1 Tablet by mouth every day. (Patient not taking: Reported on 4/4/2023)       No current [facility]-ordered facility-administered medications on file.       Allergies:  Quinine and Fentanyl        Objective:       Exam:  BP (!) 142/92 (BP Location: Left arm, Patient Position: Sitting, BP Cuff Size: Adult)   Pulse 65   Temp 36.2 °C (97.2 °F) (Temporal)   Resp 14   Ht 1.753 m (5' 9.02\")   Wt 88.5 kg (195 lb 3.2 oz)   SpO2 99%   BMI 28.81 kg/m²  Body mass index is 28.81 kg/m².    Constitutional: Alert, no distress, well-groomed.  Respiratory: Unlabored respiratory effort, no cough.  MSK: moves all extremities.  Psych: Alert and oriented x3, normal affect and mood.      Labs:   Reviewed labs done 1/6/2023, CBC was normal except for an elevated MCV, CMP was normal, lipids mildly elevated at 126 for LDL.    Assessment & Plan:     47 y.o. female with the following -     1. Chronic midline low back pain with bilateral sciatica  - Referral to Spine Surgery  I discussed with patient that I do not have many other options for medicines for pain relief.  She reports being on narcotics in the past and that they are very difficult to get off of it and would rather not go down that route again.  Does not seem like she has been talking about medications with her pain management provider, so I will ask her to discuss this " with pain management and see if they have any other recommendations.  I also do not have any imaging or records from pain management, so these have been requested.  She would like to talk to a spine surgeon to see if any options are available.  At this point she has exhausted physical therapy, undergoing multiple rounds of corticosteroid injections that seem to be not working for very long, has been on multiple medications.  She is interested in surgical intervention if this is an option for her    2. Elevated blood pressure reading  BPs are regularly over 140/90, likely secondary to significant stressors in her life.  She is hesitant to increase her Effexor so we will send her over to therapy.  If continues, I did advise her that we will likely need to start a low-dose blood pressure medication    4. Moderate episode of recurrent major depressive disorder (HCC)  - Referral to Psychology    5. Screening for colon cancer  - Referral to GI for Colonoscopy      Return in about 6 weeks (around 5/16/2023) for f/u ear issues, bp, mdd, back pain.  Patient reporting chronic issues with her ears, allergist does not think that this is due to allergies.    My total time spent caring for the patient on the day of the encounter was 48 minutes.   This does not include time spent on separately billable procedures/tests.      Please note that this dictation was created using voice recognition software. I have made every reasonable attempt to correct obvious errors, but I expect that there are errors of grammar and possibly content that I did not discover before finalizing the note.

## 2023-04-04 NOTE — ASSESSMENT & PLAN NOTE
Pt reporting worsening and increasing stressors   They have had some legal issues,  is disabled.  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  She feels like it may be related to premenopausal symptoms and stress.  Her blood pressures at home have also been around 140/90 and blood pressures at her appointments have also been around there.

## 2023-04-04 NOTE — ASSESSMENT & PLAN NOTE
Pt reports recent bps have been 140/90  Reports increasing stress recently   is disabled  Reports having a short fuse, easily irritated.   Already on effexor 75mg daily, but doesn't want to increase it as she reports being on so many meds already

## 2023-04-18 DIAGNOSIS — J45.909 ASTHMA DUE TO SEASONAL ALLERGIES: ICD-10-CM

## 2023-04-20 RX ORDER — ALBUTEROL SULFATE 90 UG/1
2 AEROSOL, METERED RESPIRATORY (INHALATION) EVERY 6 HOURS PRN
Qty: 18 G | Refills: 4 | Status: SHIPPED | OUTPATIENT
Start: 2023-04-20 | End: 2023-06-05 | Stop reason: SDUPTHER

## 2023-05-03 DIAGNOSIS — M54.41 CHRONIC MIDLINE LOW BACK PAIN WITH BILATERAL SCIATICA: ICD-10-CM

## 2023-05-03 DIAGNOSIS — G89.29 CHRONIC MIDLINE LOW BACK PAIN WITH BILATERAL SCIATICA: ICD-10-CM

## 2023-05-03 DIAGNOSIS — M54.42 CHRONIC MIDLINE LOW BACK PAIN WITH LEFT-SIDED SCIATICA: ICD-10-CM

## 2023-05-03 DIAGNOSIS — M54.42 CHRONIC MIDLINE LOW BACK PAIN WITH BILATERAL SCIATICA: ICD-10-CM

## 2023-05-03 DIAGNOSIS — G89.29 CHRONIC MIDLINE LOW BACK PAIN WITH LEFT-SIDED SCIATICA: ICD-10-CM

## 2023-05-03 NOTE — TELEPHONE ENCOUNTER
Received request via: Pharmacy    Was the patient seen in the last year in this department? Yes    Does the patient have an active prescription (recently filled or refills available) for medication(s) requested? No    Does the patient have snf Plus and need 100 day supply (blood pressure, diabetes and cholesterol meds only)? Patient does not have SCP    Future Appointments         Provider Department [place]    5/16/2023 10:40 AM (Arrive by 10:25 AM) Odilia Engel M.D. [facility]

## 2023-05-07 RX ORDER — PREGABALIN 150 MG/1
150 CAPSULE ORAL 3 TIMES DAILY
Qty: 270 CAPSULE | Refills: 0 | Status: SHIPPED | OUTPATIENT
Start: 2023-05-07 | End: 2023-08-05

## 2023-05-17 DIAGNOSIS — G89.29 CHRONIC MIDLINE LOW BACK PAIN WITH LEFT-SIDED SCIATICA: ICD-10-CM

## 2023-05-17 DIAGNOSIS — M54.42 CHRONIC MIDLINE LOW BACK PAIN WITH LEFT-SIDED SCIATICA: ICD-10-CM

## 2023-05-17 RX ORDER — METHOCARBAMOL 500 MG/1
500 TABLET, FILM COATED ORAL 4 TIMES DAILY PRN
Qty: 60 TABLET | Refills: 5 | Status: SHIPPED | OUTPATIENT
Start: 2023-05-17 | End: 2023-09-06 | Stop reason: SDUPTHER

## 2023-07-07 DIAGNOSIS — J45.909 ASTHMA DUE TO SEASONAL ALLERGIES: ICD-10-CM

## 2023-07-07 RX ORDER — LEVOCETIRIZINE DIHYDROCHLORIDE 5 MG/1
5 TABLET, FILM COATED ORAL DAILY
Qty: 90 TABLET | Refills: 3 | Status: SHIPPED | OUTPATIENT
Start: 2023-07-07

## 2023-08-16 DIAGNOSIS — M54.42 CHRONIC MIDLINE LOW BACK PAIN WITH BILATERAL SCIATICA: ICD-10-CM

## 2023-08-16 DIAGNOSIS — M54.41 CHRONIC MIDLINE LOW BACK PAIN WITH BILATERAL SCIATICA: ICD-10-CM

## 2023-08-16 DIAGNOSIS — G89.29 CHRONIC MIDLINE LOW BACK PAIN WITH BILATERAL SCIATICA: ICD-10-CM

## 2023-08-16 RX ORDER — PREGABALIN 150 MG/1
150 CAPSULE ORAL 3 TIMES DAILY
Qty: 270 CAPSULE | Refills: 0 | OUTPATIENT
Start: 2023-08-16 | End: 2023-11-14

## 2023-09-05 DIAGNOSIS — N95.1 PERIMENOPAUSAL SYMPTOMS: ICD-10-CM

## 2023-09-05 DIAGNOSIS — F33.1 MODERATE EPISODE OF RECURRENT MAJOR DEPRESSIVE DISORDER (HCC): ICD-10-CM

## 2023-09-05 RX ORDER — VENLAFAXINE HYDROCHLORIDE 75 MG/1
75 CAPSULE, EXTENDED RELEASE ORAL DAILY
Qty: 30 CAPSULE | Refills: 2 | Status: SHIPPED | OUTPATIENT
Start: 2023-09-05

## 2023-09-05 NOTE — TELEPHONE ENCOUNTER
Received request via: Pharmacy    Was the patient seen in the last year in this department? Yes    Does the patient have an active prescription (recently filled or refills available) for medication(s) requested? No    Does the patient have longterm Plus and need 100 day supply (blood pressure, diabetes and cholesterol meds only)? Patient does not have SCP

## 2023-09-06 DIAGNOSIS — M54.42 CHRONIC MIDLINE LOW BACK PAIN WITH LEFT-SIDED SCIATICA: ICD-10-CM

## 2023-09-06 DIAGNOSIS — J45.909 ASTHMA DUE TO SEASONAL ALLERGIES: ICD-10-CM

## 2023-09-06 DIAGNOSIS — G89.29 CHRONIC MIDLINE LOW BACK PAIN WITH LEFT-SIDED SCIATICA: ICD-10-CM

## 2023-09-06 RX ORDER — ALBUTEROL SULFATE 90 UG/1
2 AEROSOL, METERED RESPIRATORY (INHALATION) EVERY 6 HOURS PRN
Qty: 18 G | Refills: 5 | Status: SHIPPED | OUTPATIENT
Start: 2023-09-06

## 2023-09-06 RX ORDER — METHOCARBAMOL 500 MG/1
500 TABLET, FILM COATED ORAL 4 TIMES DAILY PRN
Qty: 60 TABLET | Refills: 5 | Status: SHIPPED | OUTPATIENT
Start: 2023-09-06

## 2024-07-31 NOTE — PROGRESS NOTES
Chief Complaint   Patient presents with   • Chest Pain     trouble breathing, visit yesterday       HISTORY OF PRESENT ILLNESS: Patient is a 45 y.o. female established patient who presents today to discuss the following issues:    Lower respiratory tract infection  Patient was seen in the urgent care on 4/5/2021 for difficulty breathing and chest discomfort.  Patient has a history of asthma that is frequently worsened during allergy season.  Patient was started on montelukast 10 mg daily and an albuterol inhaler.  Patient states that she has been coughing up small amounts of phlegm since she started using her inhaler.  Her chest x-ray was personally reviewed by me and looks to have a small focal consolidation starting in the right lower lobe.  Radiology states that it could be focal consolidation, scarring, or a small nodule.  We are going to go ahead and treat her for a lower respiratory infection with Augmentin 875-125 mg 1 tablet twice daily.  She will continue using her albuterol inhaler as needed for her asthma and she will continue to take montelukast daily through allergy season.  I have also recommended that she start taking a long-acting antihistamine.  Patient states that she picked up some loratadine and she will start taking that on a daily basis through July to help control her allergy symptoms and asthma.  We will follow-up with her in 1 week to reassess her breathing issues.  We may repeat a chest x-ray at that time should she continue to have difficulty.    Asthma due to seasonal allergies  Patient currently using albuterol inhaler, montelukast 10 mg daily.  She will be starting loratadine 10 mg daily.  She will continue taking these medications through allergy season.      Patient Active Problem List    Diagnosis Date Noted   • Lower respiratory tract infection 04/07/2021   • Asthma due to seasonal allergies 04/07/2021   • Prediabetes 04/01/2021   • History of posttraumatic stress disorder (PTSD)  04/01/2021   • Major depressive disorder 04/01/2021   • Vitamin D deficiency 04/01/2021   • Perimenopausal symptoms 04/01/2021   • Palpitations 04/13/2020   • Morbid obesity with BMI of 40.0-44.9, adult (HCC) 05/30/2017   • Hypothyroid 05/17/2016   • Family history of diabetes mellitus (DM) 03/31/2016   • Chronic low back pain 03/31/2016   • Upper back pain 03/31/2016   • Insomnia 03/31/2016       Allergies:Fentanyl and Tramadol    Current Outpatient Medications   Medication Sig Dispense Refill   • amoxicillin-clavulanate (AUGMENTIN) 875-125 MG Tab Take 1 tablet by mouth 2 times a day. 14 tablet 0   • fluconazole (DIFLUCAN) 100 MG Tab Take 1 tablet by mouth every day. 1 tablet 0   • montelukast (SINGULAIR) 10 MG Tab Take 1 tablet by mouth every evening. 30 tablet 1   • albuterol 108 (90 Base) MCG/ACT Aero Soln inhalation aerosol Inhale 2 Puffs every 6 hours as needed for Shortness of Breath. 8.5 g 3   • dexamethasone (DECADRON) 6 MG Tab Take 1 tablet by mouth every day for 3 days. 3 tablet 0   • sertraline (ZOLOFT) 25 MG tablet Take 1 tablet by mouth every day. 30 tablet 0   • Omega-3 Fatty Acids (FISH OIL) 1000 MG Cap capsule Take 1,000 mg by mouth 1 time daily as needed.     • Methylsulfonylmethane (MSM PO) Take  by mouth.     • POTASSIUM PO Take  by mouth.       No current facility-administered medications for this visit.       Hospital Outpatient Visit on 04/05/2021   Component Date Value Ref Range Status   • Free T-4 04/05/2021 0.93  0.93 - 1.70 ng/dL Final    Please note new FT4 reference range effective 4/29/2020.   • T3 04/05/2021 140.0  60.0 - 181.0 ng/dL Final   • TSH 04/05/2021 4.320  0.380 - 5.330 uIU/mL Final    Comment: Please note new reference ranges effective 12/14/2017 10:00 AM  Pregnant Females, 1st Trimester  0.050-3.700  Pregnant Females, 2nd Trimester  0.310-4.350  Pregnant Females, 3rd Trimester  0.410-5.180     • Cholesterol,Tot 04/05/2021 159  100 - 199 mg/dL Final   • Triglycerides  04/05/2021 127  0 - 149 mg/dL Final   • HDL 04/05/2021 29* >=40 mg/dL Final   • LDL 04/05/2021 105* <100 mg/dL Final   • GFR If  04/05/2021 >60  >60 mL/min/1.73 m 2 Final   • GFR If Non  04/05/2021 >60  >60 mL/min/1.73 m 2 Final   • Sodium 04/05/2021 139  135 - 145 mmol/L Final   • Potassium 04/05/2021 4.6  3.6 - 5.5 mmol/L Final   • Chloride 04/05/2021 105  96 - 112 mmol/L Final   • Co2 04/05/2021 22  20 - 33 mmol/L Final   • Anion Gap 04/05/2021 12.0  7.0 - 16.0 Final   • Glucose 04/05/2021 112* 65 - 99 mg/dL Final   • Bun 04/05/2021 19  8 - 22 mg/dL Final   • Creatinine 04/05/2021 0.65  0.50 - 1.40 mg/dL Final   • Calcium 04/05/2021 9.3  8.5 - 10.5 mg/dL Final   • AST(SGOT) 04/05/2021 20  12 - 45 U/L Final   • ALT(SGPT) 04/05/2021 19  2 - 50 U/L Final   • Alkaline Phosphatase 04/05/2021 79  30 - 99 U/L Final   • Total Bilirubin 04/05/2021 0.3  0.1 - 1.5 mg/dL Final   • Albumin 04/05/2021 4.4  3.2 - 4.9 g/dL Final   • Total Protein 04/05/2021 7.5  6.0 - 8.2 g/dL Final   • Globulin 04/05/2021 3.1  1.9 - 3.5 g/dL Final   • A-G Ratio 04/05/2021 1.4  g/dL Final   • WBC 04/05/2021 12.1* 4.8 - 10.8 K/uL Final   • RBC 04/05/2021 4.29  4.20 - 5.40 M/uL Final   • Hemoglobin 04/05/2021 13.3  12.0 - 16.0 g/dL Final   • Hematocrit 04/05/2021 43.2  37.0 - 47.0 % Final   • MCV 04/05/2021 100.7* 81.4 - 97.8 fL Final   • MCH 04/05/2021 31.0  27.0 - 33.0 pg Final   • MCHC 04/05/2021 30.8* 33.6 - 35.0 g/dL Final   • RDW 04/05/2021 50.6* 35.9 - 50.0 fL Final   • Platelet Count 04/05/2021 215  164 - 446 K/uL Final   • MPV 04/05/2021 11.9  9.0 - 12.9 fL Final   • Neutrophils-Polys 04/05/2021 70.70  44.00 - 72.00 % Final   • Lymphocytes 04/05/2021 20.30* 22.00 - 41.00 % Final   • Monocytes 04/05/2021 5.90  0.00 - 13.40 % Final   • Eosinophils 04/05/2021 1.90  0.00 - 6.90 % Final   • Basophils 04/05/2021 0.70  0.00 - 1.80 % Final   • Immature Granulocytes 04/05/2021 0.50  0.00 - 0.90 % Final   • Nucleated  RBC 2021 0.00  /100 WBC Final   • Neutrophils (Absolute) 2021 8.55* 2.00 - 7.15 K/uL Final    Includes immature neutrophils, if present.   • Lymphs (Absolute) 2021 2.46  1.00 - 4.80 K/uL Final   • Monos (Absolute) 2021 0.71  0.00 - 0.85 K/uL Final   • Eos (Absolute) 2021 0.23  0.00 - 0.51 K/uL Final   • Baso (Absolute) 2021 0.08  0.00 - 0.12 K/uL Final   • Immature Granulocytes (abs) 2021 0.06  0.00 - 0.11 K/uL Final   • NRBC (Absolute) 2021 0.00  K/uL Final   • Glycohemoglobin 2021 6.2* 4.0 - 5.6 % Final    Comment: Increased risk for diabetes:  5.7 -6.4%  Diabetes:  >6.4%  Glycemic control for adults with diabetes:  <7.0%  The above interpretations are per ADA guidelines.  Diagnosis  of diabetes mellitus on the basis of elevated Hemoglobin A1c  should be confirmed by repeating the Hb A1c test.     • Est Avg Glucose 2021 131  mg/dL Final    Comment: The eAG calculation is based on the A1c-Derived Daily Glucose  (ADAG) study.  See the ADA's website for additional information.     ]    The 10-year ASCVD risk score (Lewistongurwinder KIRKPATRICK Jr., et al., 2013) is: 1.8%    Values used to calculate the score:      Age: 45 years      Sex: Female      Is Non- : No      Diabetic: No      Tobacco smoker: No      Systolic Blood Pressure: 140 mmHg      Is BP treated: No      HDL Cholesterol: 29 mg/dL      Total Cholesterol: 159 mg/dL    Social History     Tobacco Use   • Smoking status: Former Smoker     Packs/day: 1.00     Years: 35.00     Pack years: 35.00     Types: Cigarettes     Start date:    • Smokeless tobacco: Never Used   Substance Use Topics   • Alcohol use: No     Alcohol/week: 0.0 oz   • Drug use: Yes     Frequency: 3.0 times per week     Types: Marijuana, Oral     Comment: tincture       Family Status   Relation Name Status   • Mo  Alive   • Fa  Alive   • Bro  Alive   • MGMo     • MGFa     • PGMo     • PGFa  Alive      Family History   Problem Relation Age of Onset   • Lung Disease Mother         COPD   • Hypertension Mother         Pulmonary hypertension   • Arthritis Mother    • Cancer Mother         melanoma (skin)   • Lupus Mother    • Mult Sclerosis Father    • Hypertension Brother    • Alcohol abuse Brother    • Cancer Maternal Grandmother         Uterine   • Diabetes Maternal Grandfather    • Heart Disease Maternal Grandfather    • Hypertension Maternal Grandfather    • Hyperlipidemia Maternal Grandfather    • Heart Attack Maternal Grandfather    • No Known Problems Paternal Grandfather        Patient's last menstrual period was 03/24/2021 (within days).    Health Maintenance Summary                COVID-19 Vaccine Overdue 10/1/1991     PAP SMEAR Overdue 10/1/1996     MAMMOGRAM Overdue 10/1/2015     IMM DTaP/Tdap/Td Vaccine Next Due 5/30/2027      Done 5/30/2017 Imm Admin: Tdap Vaccine           Review of Systems:   Constitutional: Negative for fever, chills, weight change, fatigue, loss of appetite.  HNT: Negative for nosebleeds, congestion, odynophagia, sore throat or changes in taste.    Eyes: Negative for vision changes.   Ears: Negative for recent changes in hearing, pain or discharge.  Neck: Negative for pain, swelling, lumps or goiter.  Respiratory: Shortness of breath, cough, sputum production.  Negative for wheezing.    Cardiovascular: Negative for chest pain, palpitations, orthopnea and leg swelling.   Gastrointestinal: Negative for constipation, diarrhea, heartburn, dysphagia, nausea, vomiting or abdominal pain.   Genitourinary: Negative for dysuria, urgency and frequency.   Musculoskeletal: Negative for myalgias, joint pain, and back pain.  Skin: Negative for skin, hair or nail changes, rash, itching.   Neurological: Negative for dizziness, tingling, tremors, sensory change, gait/coordination changes, focal weakness and headaches.   Endo/Heme/Allergies: Does not bruise/bleed easily.   Psychiatric/Behavioral:  "Negative for depression, suicidal ideas and memory loss.  The patient is not nervous/anxious and does not have insomnia.        Exam:  /98 (BP Location: Left arm, Patient Position: Sitting, BP Cuff Size: Adult long)   Pulse 89   Temp 36.3 °C (97.4 °F) (Temporal)   Resp 20   Ht 1.727 m (5' 8\")   Wt 125 kg (275 lb)   SpO2 97%  Body mass index is 41.81 kg/m².  General:  Well nourished, well developed female. Body mass index is 41.81 kg/m².  No apparent distress.  Eyes: EOM intact, PERRL, conjunctiva non-injected, sclera non-icteric.  Ears: Lizbeth pinnae, external auditory canals, TM pearly gray with normal light reflex bilaterally.  Neck: Supple with no cervical lymphadenopathy, JVD, palpable thyroid nodules or carotid bruits.  Pulmonary: Occasional light wheezing on ausculation bilaterally. Normal effort. No rales, ronchi.  Cardiovascular: Regular rate and rhythm without murmur, rub or gallop.   Extremities: Full range of motion. Warm and well perfused with no edema.  Skin: Intact with no obvious rashes or lesions.  Neuro: Cranial nerves I-XII intact.  Psych: Alert and oriented x 3.  Appropriately dressed. Mood and affect appropriate.      Assessment/Plan:  1. Lower respiratory tract infection  amoxicillin-clavulanate (AUGMENTIN) 875-125 MG Tab    fluconazole (DIFLUCAN) 100 MG Tab   2. Asthma due to seasonal allergies         Reviewed risks and benefits of treatment plan. Patient verbally agrees to plan of care.     Return in about 1 week (around 4/14/2021) for f/u lower respriatory infection.    Please note that this dictation was created using voice recognition software. I have made every reasonable attempt to correct obvious errors, but I expect that there are errors of grammar and possibly content that I did not discover before finalizing the note.  " neck pain

## (undated) DEVICE — TOWEL STOP TIMEOUT SAFETY FLAG (40EA/CA)

## (undated) DEVICE — GLOVE SZ 7.5 BIOGEL PI MICRO - PF LF (50PR/BX)

## (undated) DEVICE — MAT PATIENT POSITIONING PREVALON (10EA/CA)

## (undated) DEVICE — GLOVE BIOGEL PI ULTRATOUCH SZ 7.0 SURGICAL PF LF- POWDER FREE (50/BX 4BX/CA)

## (undated) DEVICE — CANISTER SUCTION 3000ML MECHANICAL FILTER AUTO SHUTOFF MEDI-VAC NONSTERILE LF DISP  (40EA/CA)

## (undated) DEVICE — RELOAD WITH GRIPPING SURGACE TECHNOLOGY GREEN 60MM (12EA/BX)

## (undated) DEVICE — HEAD HOLDER JUNIOR/ADULT

## (undated) DEVICE — NEPTUNE 4 PORT MANIFOLD - (20/PK)

## (undated) DEVICE — SENSOR SPO2 NEO LNCS ADHESIVE (20/BX) SEE USER NOTES

## (undated) DEVICE — TUBING CLEARLINK DUO-VENT - C-FLO (48EA/CA)

## (undated) DEVICE — SUTURE GENERAL

## (undated) DEVICE — SUTURE2-0 27IN VCRL ANTI VIOL (36PK/BX)

## (undated) DEVICE — DRAPESURG STERI-DRAPE LONG - (10/BX 4BX/CA)

## (undated) DEVICE — SET TUBING PNEUMOCLEAR HIGH FLOW SMOKE EVACUATION (10EA/BX)

## (undated) DEVICE — SUCTION INSTRUMENT YANKAUER BULBOUS TIP W/O VENT (50EA/CA)

## (undated) DEVICE — TISSEEL 4ML ----MUST ORDER A MIN OF 6EA----

## (undated) DEVICE — PACK GASTRIC BANDING OR - (1/CA)

## (undated) DEVICE — SET EXTENSION WITH 2 PORTS (48EA/CA) ***PART #2C8610 IS A SUBSTITUTE*****

## (undated) DEVICE — GOWN WARMING STANDARD FLEX - (30/CA)

## (undated) DEVICE — SUTURE 0 LIGATING REEL VICRYL PLUS (12PK/BX)

## (undated) DEVICE — PROTECTOR ULNA NERVE - (36PR/CA)

## (undated) DEVICE — SET LEADWIRE 5 LEAD BEDSIDE DISPOSABLE ECG (1SET OF 5/EA)

## (undated) DEVICE — TROCAR SEPARATOR 15MMZTHREAD - (6/BX)

## (undated) DEVICE — SUTURE 4-0 VICRYL PLUSFS-1 - 27 INCH (36/BX)

## (undated) DEVICE — APPLICATOR DUPLO SPRAYER (5EA/CA)

## (undated) DEVICE — ELECTRODE DUAL RETURN W/ CORD - (50/PK)

## (undated) DEVICE — RELOAD WITH GRIPPING SURFACE TECHNOLOGY BLUE 60MM (12EA/BX)

## (undated) DEVICE — BAG RETRIEVAL 12/15 MM INZII (5EA/CA) THIS WILL REPLACE ITEM 75018

## (undated) DEVICE — GOWN WARMING X-LARGE FLEX - (20/CA)

## (undated) DEVICE — CHLORAPREP 26 ML APPLICATOR - ORANGE TINT(25/CA)

## (undated) DEVICE — SLEEVE, VASO, REPROC, LARGE

## (undated) DEVICE — TROCAR 5X100 NON BLADED Z-TH - READ KII (6/BX)

## (undated) DEVICE — GLOVE BIOGEL PI INDICATOR SZ 7.0 SURGICAL PF LF - (50/BX 4BX/CA)

## (undated) DEVICE — STAPLER POWERED 60MM (3EA/BX)

## (undated) DEVICE — KIT ANESTHESIA W/CIRCUIT & 3/LT BAG W/FILTER (20EA/CA)

## (undated) DEVICE — RELOAD WITH GRIPPING SURFACE TECHNOLOGY GOLD 60MM (12EA/BX)

## (undated) DEVICE — ELECTRODE 850 FOAM ADHESIVE - HYDROGEL RADIOTRNSPRNT (50/PK)

## (undated) DEVICE — CANNULA W/SEAL 5X100 Z-THRE - ADED KII (12/BX)

## (undated) DEVICE — MASK ANESTHESIA ADULT  - (100/CA)

## (undated) DEVICE — CLIP APPLIER 10MM ENDO LARGE (3EA/BX)

## (undated) DEVICE — GLOVE BIOGEL PI INDICATOR SZ 7.5 SURGICAL PF LF -(50/BX 4BX/CA)

## (undated) DEVICE — PERISTRIP 60 STAPLE LINE REINFORCEMENT (6EA/CA)

## (undated) DEVICE — SYSTEM CALIBARATION  GASTRECTOMY 40FR WITH BULB (5/BX)

## (undated) DEVICE — GLOVE SIZE 6.5  SURGEON ACCELERATOR FREE GREEN (50PR/BX)

## (undated) DEVICE — GLOVE SZ 7 BIOGEL PI MICRO - PF LF (50PR/BX 4BX/CA)

## (undated) DEVICE — SODIUM CHL IRRIGATION 0.9% 1000ML (12EA/CA)

## (undated) DEVICE — LACTATED RINGERS INJ 1000 ML - (14EA/CA 60CA/PF)